# Patient Record
Sex: FEMALE | Race: WHITE | NOT HISPANIC OR LATINO
[De-identification: names, ages, dates, MRNs, and addresses within clinical notes are randomized per-mention and may not be internally consistent; named-entity substitution may affect disease eponyms.]

---

## 2018-06-21 ENCOUNTER — APPOINTMENT (OUTPATIENT)
Dept: OBGYN | Facility: CLINIC | Age: 58
End: 2018-06-21

## 2018-08-13 ENCOUNTER — APPOINTMENT (OUTPATIENT)
Dept: OBGYN | Facility: CLINIC | Age: 58
End: 2018-08-13
Payer: COMMERCIAL

## 2018-08-13 VITALS
WEIGHT: 140 LBS | SYSTOLIC BLOOD PRESSURE: 126 MMHG | BODY MASS INDEX: 27.48 KG/M2 | HEIGHT: 60 IN | HEART RATE: 73 BPM | DIASTOLIC BLOOD PRESSURE: 77 MMHG

## 2018-08-13 DIAGNOSIS — Z01.419 ENCOUNTER FOR GYNECOLOGICAL EXAMINATION (GENERAL) (ROUTINE) W/OUT ABNORMAL FINDINGS: ICD-10-CM

## 2018-08-13 PROCEDURE — 99396 PREV VISIT EST AGE 40-64: CPT

## 2018-08-14 LAB — HPV HIGH+LOW RISK DNA PNL CVX: NOT DETECTED

## 2018-08-16 LAB — CYTOLOGY CVX/VAG DOC THIN PREP: NORMAL

## 2020-01-30 ENCOUNTER — APPOINTMENT (OUTPATIENT)
Dept: OBGYN | Facility: CLINIC | Age: 60
End: 2020-01-30
Payer: COMMERCIAL

## 2020-01-30 VITALS
DIASTOLIC BLOOD PRESSURE: 80 MMHG | WEIGHT: 136 LBS | BODY MASS INDEX: 26.7 KG/M2 | HEIGHT: 60 IN | SYSTOLIC BLOOD PRESSURE: 120 MMHG

## 2020-01-30 PROCEDURE — 82270 OCCULT BLOOD FECES: CPT

## 2020-01-30 PROCEDURE — 99396 PREV VISIT EST AGE 40-64: CPT

## 2020-01-30 NOTE — PHYSICAL EXAM
[Awake] : awake [Alert] : alert [Acute Distress] : no acute distress [Mass] : no breast mass [Soft] : soft [Nipple Discharge] : no nipple discharge [Axillary LAD] : no axillary lymphadenopathy [Tender] : non tender [Oriented x3] : oriented to person, place, and time [No Bleeding] : there was no active vaginal bleeding [Normal] : cervix [Occult Blood] : occult blood test from digital rectal exam was negative [Uterine Adnexae] : were not tender and not enlarged [RRR, No Murmurs] : RRR, no murmurs [CTAB] : CTAB

## 2020-01-31 LAB — HPV HIGH+LOW RISK DNA PNL CVX: NOT DETECTED

## 2020-02-06 LAB — CYTOLOGY CVX/VAG DOC THIN PREP: ABNORMAL

## 2020-11-05 ENCOUNTER — APPOINTMENT (OUTPATIENT)
Dept: OBGYN | Facility: CLINIC | Age: 60
End: 2020-11-05
Payer: COMMERCIAL

## 2020-11-05 VITALS
DIASTOLIC BLOOD PRESSURE: 83 MMHG | HEIGHT: 63 IN | BODY MASS INDEX: 23.04 KG/M2 | SYSTOLIC BLOOD PRESSURE: 147 MMHG | WEIGHT: 130 LBS

## 2020-11-05 PROCEDURE — 99214 OFFICE O/P EST MOD 30 MIN: CPT

## 2020-11-05 PROCEDURE — 99072 ADDL SUPL MATRL&STAF TM PHE: CPT

## 2020-11-05 NOTE — DISCUSSION/SUMMARY
[FreeTextEntry1] : Uterine Prolapse- discussed use of pessary vs. surgery. She would like to talk to MD here about surgical correction, not interested in pessary.

## 2020-11-05 NOTE — HISTORY OF PRESENT ILLNESS
[FreeTextEntry1] : 59 yo here c/o feeling a lump in the vaginal area, especially when she stands up, less while laying down. she noticed this approx. 1 week ago. She has been moving heavy boxes for the past month and wonders if this has caused the problem. she has no vaginal pain, no pelvic pain, no urinary leaking.

## 2020-11-05 NOTE — PHYSICAL EXAM
[Labia Majora] : normal [Labia Minora] : normal [Cystocele] : a cystocele [Rectocele] : a rectocele [Uterine Prolapse] : uterine prolapse [Normal] : normal [Uterine Adnexae] : normal [FreeTextEntry4] : Grade 2/3 uterine prolapse noted

## 2020-11-19 ENCOUNTER — APPOINTMENT (OUTPATIENT)
Dept: OBGYN | Facility: CLINIC | Age: 60
End: 2020-11-19
Payer: COMMERCIAL

## 2020-11-19 VITALS
WEIGHT: 130 LBS | HEART RATE: 81 BPM | SYSTOLIC BLOOD PRESSURE: 126 MMHG | DIASTOLIC BLOOD PRESSURE: 87 MMHG | HEIGHT: 63 IN | BODY MASS INDEX: 23.04 KG/M2

## 2020-11-19 PROCEDURE — 99214 OFFICE O/P EST MOD 30 MIN: CPT

## 2020-12-15 ENCOUNTER — APPOINTMENT (OUTPATIENT)
Dept: OBGYN | Facility: CLINIC | Age: 60
End: 2020-12-15

## 2021-02-22 ENCOUNTER — APPOINTMENT (OUTPATIENT)
Dept: OBGYN | Facility: CLINIC | Age: 61
End: 2021-02-22
Payer: COMMERCIAL

## 2021-02-22 VITALS
SYSTOLIC BLOOD PRESSURE: 120 MMHG | BODY MASS INDEX: 23.39 KG/M2 | DIASTOLIC BLOOD PRESSURE: 80 MMHG | WEIGHT: 132 LBS | HEIGHT: 63 IN

## 2021-02-22 PROCEDURE — 82270 OCCULT BLOOD FECES: CPT

## 2021-02-22 PROCEDURE — 99396 PREV VISIT EST AGE 40-64: CPT

## 2021-02-22 PROCEDURE — 99072 ADDL SUPL MATRL&STAF TM PHE: CPT

## 2021-02-26 LAB
CYTOLOGY CVX/VAG DOC THIN PREP: ABNORMAL
HPV HIGH+LOW RISK DNA PNL CVX: DETECTED

## 2021-03-16 ENCOUNTER — APPOINTMENT (OUTPATIENT)
Dept: OBGYN | Facility: CLINIC | Age: 61
End: 2021-03-16
Payer: COMMERCIAL

## 2021-03-16 ENCOUNTER — ASOB RESULT (OUTPATIENT)
Age: 61
End: 2021-03-16

## 2021-03-16 VITALS
WEIGHT: 132 LBS | SYSTOLIC BLOOD PRESSURE: 120 MMHG | HEIGHT: 63 IN | DIASTOLIC BLOOD PRESSURE: 70 MMHG | BODY MASS INDEX: 23.39 KG/M2

## 2021-03-16 DIAGNOSIS — N81.2 INCOMPLETE UTEROVAGINAL PROLAPSE: ICD-10-CM

## 2021-03-16 PROCEDURE — 99213 OFFICE O/P EST LOW 20 MIN: CPT

## 2021-03-16 PROCEDURE — 76856 US EXAM PELVIC COMPLETE: CPT | Mod: 59

## 2021-03-16 PROCEDURE — 99072 ADDL SUPL MATRL&STAF TM PHE: CPT

## 2021-03-16 PROCEDURE — 76830 TRANSVAGINAL US NON-OB: CPT

## 2021-03-16 NOTE — HISTORY OF PRESENT ILLNESS
[FreeTextEntry1] : pt here to fu us done to evaluate adnexa prior to tvh planned for prolapse. adnexa wnl, uterus small, thin lining, small amt fluid in lining.

## 2021-04-29 ENCOUNTER — OUTPATIENT (OUTPATIENT)
Dept: OUTPATIENT SERVICES | Facility: HOSPITAL | Age: 61
LOS: 1 days | End: 2021-04-29
Payer: COMMERCIAL

## 2021-04-29 VITALS
HEART RATE: 76 BPM | DIASTOLIC BLOOD PRESSURE: 80 MMHG | WEIGHT: 134.48 LBS | OXYGEN SATURATION: 98 % | SYSTOLIC BLOOD PRESSURE: 138 MMHG | TEMPERATURE: 98 F | HEIGHT: 60 IN | RESPIRATION RATE: 20 BRPM

## 2021-04-29 DIAGNOSIS — E78.5 HYPERLIPIDEMIA, UNSPECIFIED: ICD-10-CM

## 2021-04-29 DIAGNOSIS — N81.2 INCOMPLETE UTEROVAGINAL PROLAPSE: ICD-10-CM

## 2021-04-29 DIAGNOSIS — E03.9 HYPOTHYROIDISM, UNSPECIFIED: ICD-10-CM

## 2021-04-29 DIAGNOSIS — Z13.89 ENCOUNTER FOR SCREENING FOR OTHER DISORDER: ICD-10-CM

## 2021-04-29 DIAGNOSIS — N81.4 UTEROVAGINAL PROLAPSE, UNSPECIFIED: ICD-10-CM

## 2021-04-29 DIAGNOSIS — Z01.818 ENCOUNTER FOR OTHER PREPROCEDURAL EXAMINATION: ICD-10-CM

## 2021-04-29 DIAGNOSIS — Z29.9 ENCOUNTER FOR PROPHYLACTIC MEASURES, UNSPECIFIED: ICD-10-CM

## 2021-04-29 DIAGNOSIS — Z90.89 ACQUIRED ABSENCE OF OTHER ORGANS: Chronic | ICD-10-CM

## 2021-04-29 LAB
A1C WITH ESTIMATED AVERAGE GLUCOSE RESULT: 6.6 % — HIGH (ref 4–5.6)
ANION GAP SERPL CALC-SCNC: 11 MMOL/L — SIGNIFICANT CHANGE UP (ref 5–17)
APPEARANCE UR: CLEAR — SIGNIFICANT CHANGE UP
APTT BLD: 32.5 SEC — SIGNIFICANT CHANGE UP (ref 27.5–35.5)
BACTERIA # UR AUTO: NEGATIVE — SIGNIFICANT CHANGE UP
BASOPHILS # BLD AUTO: 0.11 K/UL — SIGNIFICANT CHANGE UP (ref 0–0.2)
BASOPHILS NFR BLD AUTO: 1.4 % — SIGNIFICANT CHANGE UP (ref 0–2)
BILIRUB UR-MCNC: NEGATIVE — SIGNIFICANT CHANGE UP
BLD GP AB SCN SERPL QL: SIGNIFICANT CHANGE UP
BUN SERPL-MCNC: 12 MG/DL — SIGNIFICANT CHANGE UP (ref 8–20)
CALCIUM SERPL-MCNC: 9.8 MG/DL — SIGNIFICANT CHANGE UP (ref 8.6–10.2)
CHLORIDE SERPL-SCNC: 99 MMOL/L — SIGNIFICANT CHANGE UP (ref 98–107)
CO2 SERPL-SCNC: 28 MMOL/L — SIGNIFICANT CHANGE UP (ref 22–29)
COLOR SPEC: YELLOW — SIGNIFICANT CHANGE UP
CREAT SERPL-MCNC: 0.61 MG/DL — SIGNIFICANT CHANGE UP (ref 0.5–1.3)
DIFF PNL FLD: NEGATIVE — SIGNIFICANT CHANGE UP
EOSINOPHIL # BLD AUTO: 0.27 K/UL — SIGNIFICANT CHANGE UP (ref 0–0.5)
EOSINOPHIL NFR BLD AUTO: 3.5 % — SIGNIFICANT CHANGE UP (ref 0–6)
EPI CELLS # UR: SIGNIFICANT CHANGE UP
ESTIMATED AVERAGE GLUCOSE: 143 MG/DL — HIGH (ref 68–114)
GLUCOSE SERPL-MCNC: 121 MG/DL — HIGH (ref 70–99)
GLUCOSE UR QL: NEGATIVE MG/DL — SIGNIFICANT CHANGE UP
HCT VFR BLD CALC: 42.8 % — SIGNIFICANT CHANGE UP (ref 34.5–45)
HGB BLD-MCNC: 14.3 G/DL — SIGNIFICANT CHANGE UP (ref 11.5–15.5)
IMM GRANULOCYTES NFR BLD AUTO: 0.9 % — SIGNIFICANT CHANGE UP (ref 0–1.5)
INR BLD: 1.04 RATIO — SIGNIFICANT CHANGE UP (ref 0.88–1.16)
KETONES UR-MCNC: NEGATIVE — SIGNIFICANT CHANGE UP
LEUKOCYTE ESTERASE UR-ACNC: ABNORMAL
LYMPHOCYTES # BLD AUTO: 2.74 K/UL — SIGNIFICANT CHANGE UP (ref 1–3.3)
LYMPHOCYTES # BLD AUTO: 35.4 % — SIGNIFICANT CHANGE UP (ref 13–44)
MCHC RBC-ENTMCNC: 29.5 PG — SIGNIFICANT CHANGE UP (ref 27–34)
MCHC RBC-ENTMCNC: 33.4 GM/DL — SIGNIFICANT CHANGE UP (ref 32–36)
MCV RBC AUTO: 88.2 FL — SIGNIFICANT CHANGE UP (ref 80–100)
MONOCYTES # BLD AUTO: 0.58 K/UL — SIGNIFICANT CHANGE UP (ref 0–0.9)
MONOCYTES NFR BLD AUTO: 7.5 % — SIGNIFICANT CHANGE UP (ref 2–14)
NEUTROPHILS # BLD AUTO: 3.98 K/UL — SIGNIFICANT CHANGE UP (ref 1.8–7.4)
NEUTROPHILS NFR BLD AUTO: 51.3 % — SIGNIFICANT CHANGE UP (ref 43–77)
NITRITE UR-MCNC: NEGATIVE — SIGNIFICANT CHANGE UP
PH UR: 7 — SIGNIFICANT CHANGE UP (ref 5–8)
PLATELET # BLD AUTO: 464 K/UL — HIGH (ref 150–400)
POTASSIUM SERPL-MCNC: 4.2 MMOL/L — SIGNIFICANT CHANGE UP (ref 3.5–5.3)
POTASSIUM SERPL-SCNC: 4.2 MMOL/L — SIGNIFICANT CHANGE UP (ref 3.5–5.3)
PROT UR-MCNC: NEGATIVE MG/DL — SIGNIFICANT CHANGE UP
PROTHROM AB SERPL-ACNC: 12 SEC — SIGNIFICANT CHANGE UP (ref 10.6–13.6)
RBC # BLD: 4.85 M/UL — SIGNIFICANT CHANGE UP (ref 3.8–5.2)
RBC # FLD: 13.6 % — SIGNIFICANT CHANGE UP (ref 10.3–14.5)
RBC CASTS # UR COMP ASSIST: NEGATIVE /HPF — SIGNIFICANT CHANGE UP (ref 0–4)
SODIUM SERPL-SCNC: 138 MMOL/L — SIGNIFICANT CHANGE UP (ref 135–145)
SP GR SPEC: 1 — LOW (ref 1.01–1.02)
UROBILINOGEN FLD QL: NEGATIVE MG/DL — SIGNIFICANT CHANGE UP
WBC # BLD: 7.75 K/UL — SIGNIFICANT CHANGE UP (ref 3.8–10.5)
WBC # FLD AUTO: 7.75 K/UL — SIGNIFICANT CHANGE UP (ref 3.8–10.5)
WBC UR QL: SIGNIFICANT CHANGE UP

## 2021-04-29 PROCEDURE — 93010 ELECTROCARDIOGRAM REPORT: CPT

## 2021-04-29 PROCEDURE — G0463: CPT

## 2021-04-29 PROCEDURE — 93005 ELECTROCARDIOGRAM TRACING: CPT

## 2021-04-29 NOTE — H&P PST ADULT - NSANTHOSAYNRD_GEN_A_CORE
No. LEW screening performed.  STOP BANG Legend: 0-2 = LOW Risk; 3-4 = INTERMEDIATE Risk; 5-8 = HIGH Risk

## 2021-04-29 NOTE — H&P PST ADULT - LAB RESULTS AND INTERPRETATION
results pending Labs reviewed results faxed to PCP and surgeon for review. Patient is pending medical clearance. Repeat CBC ordered for morning of surgery.

## 2021-04-29 NOTE — H&P PST ADULT - ASSESSMENT
This is a pleasant 61 year old female in NAD with history of HLD, hypothyroidism and chronically dry eyes. Diagnosed with incomplete uterovaginal prolapse. Now scheduled for total vaginal abdominal hysterectomy, anterior/posterior repair with Dr. Oropeza on 21 pending medical clearance.   Patient to have medical clearance with Dr. Benitez  Patient educated on surgical scrub, COVID testing, preadmission instructions, medical clearance and day of procedure medications, verbalizes understanding.  Patient instructed to stop ASA/Herbals or anti-inflammatory meds one week prior to surgery and discuss with PCP.      CAPRINI SCORE    AGE RELATED RISK FACTORS                                                             [ ] Age 41-60 years                                            (1 Point)  [X ] Age: 61-74 years                                           (2 Points)                 [ ] Age= 75 years                                                (3 Points)             DISEASE RELATED RISK FACTORS                                                       [ ] Edema in the lower extremities                 (1 Point)                     [ ] Varicose veins                                               (1 Point)                                 [X ] BMI > 25 Kg/m2                                            (1 Point)                                  [ ] Serious infection (ie PNA)                            (1 Point)                     [ ] Lung disease ( COPD, Emphysema)            (1 Point)                                                                          [ ] Acute myocardial infarction                         (1 Point)                  [ ] Congestive heart failure (in the previous month)  (1 Point)         [ ] Inflammatory bowel disease                            (1 Point)                  [ ] Central venous access, PICC or Port               (2 points)       (within the last month)                                                                [ ] Stroke (in the previous month)                        (5 Points)    [ ] Previous or present malignancy                       (2 points)                                                                                                                                                         HEMATOLOGY RELATED FACTORS                                                         [ ] Prior episodes of VTE                                     (3 Points)                     [ ] Positive family history for VTE                      (3 Points)                  [ ] Prothrombin 44402 A                                     (3 Points)                     [ ] Factor V Leiden                                                (3 Points)                        [ ] Lupus anticoagulants                                      (3 Points)                                                           [ ] Anticardiolipin antibodies                              (3 Points)                                                       [ ] High homocysteine in the blood                   (3 Points)                                             [ ] Other congenital or acquired thrombophilia      (3 Points)                                                [ ] Heparin induced thrombocytopenia                  (3 Points)                                        MOBILITY RELATED FACTORS  [ ] Bed rest                                                         (1 Point)  [ ] Plaster cast                                                    (2 points)  [ ] Bed bound for more than 72 hours           (2 Points)    GENDER SPECIFIC FACTORS  [ ] Pregnancy or had a baby within the last month   (1 Point)  [ ] Post-partum < 6 weeks                                   (1 Point)  [ ] Hormonal therapy  or oral contraception   (1 Point)  [ ] History of pregnancy complications              (1 point)  [ ] Unexplained or recurrent              (1 Point)    OTHER RISK FACTORS                                           (1 Point)  [X ] BMI >40, smoking, diabetes requiring insulin, chemotherapy  blood transfusions and length of surgery over 2 hours    SURGERY RELATED RISK FACTORS  [ ]  Section within the last month     (1 Point)  [ ] Minor surgery                                                  (1 Point)  [ ] Arthroscopic surgery                                       (2 Points)  [ X] Planned major surgery lasting more            (2 Points)      than 45 minutes     [ ] Elective hip or knee joint replacement       (5 points)       surgery                                                TRAUMA RELATED RISK FACTORS  [ ] Fracture of the hip, pelvis, or leg                       (5 Points)  [ ] Spinal cord injury resulting in paralysis             (5 points)       (in the previous month)    [ ] Paralysis  (less than 1 month)                             (5 Points)  [ ] Multiple Trauma within 1 month                        (5 Points)    Total Score [     6   ]    Caprini Score 0-2: Low Risk, NO VTE prophylaxis required for most patients, encourage ambulation  Caprini Score 3-6: Moderate Risk , pharmacologic VTE prophylaxis is indicated for most patients (in the absence of contraindications)  Caprini Score Greater than or =7: High risk, pharmocologic VTE prophylaxis indicated for most patients (in the absence of contraindications)    OPIOID RISK TOOL    KAMI EACH BOX THAT APPLIES AND ADD TOTALS AT THE END    FAMILY HISTORY OF SUBSTANCE ABUSE                 FEMALE         MALE                                                Alcohol                             [  ]1 pt          [  ]3pts                                               Illegal Durgs                     [  ]2 pts        [  ]3pts                                               Rx Drugs                           [  ]4 pts        [  ]4 pts    PERSONAL HISTORY OF SUBSTANCE ABUSE                                                                                          Alcohol                             [  ]3 pts       [  ]3 pts                                               Illegal Drugs                     [  ]4 pts        [  ]4 pts                                               Rx Drugs                           [  ]5 pts        [  ]5 pts    AGE BETWEEN 16-45 YEARS                                      [  ]1 pt         [  ]1 pt    HISTORY OF PREADOLESCENT   SEXUAL ABUSE                                                             [  ]3 pts        [  ]0pts    PSYCHOLOGICAL DISEASE                     ADD, OCD, Bipolar, Schizophrenia        [  ]2 pts         [  ]2 pts                      Depression                                               [  ]1 pt           [  ]1 pt           SCORING TOTAL   (add numbers and type here)              (**0*)                                     A score of 3 or lower indicated LOW risk for future opioid abuse  A score of 4 to 7 indicated moderate risk for future opioid abuse  A score of 8 or higher indicates a high risk for opioid abuse

## 2021-04-29 NOTE — H&P PST ADULT - RS GEN PE MLT RESP DETAILS PC
breath sounds equal/good air movement/respirations non-labored/clear to auscultation bilaterally/no rales/no rhonchi

## 2021-04-29 NOTE — H&P PST ADULT - NS MD HP PULSE CAROTID
What Type Of Note Output Would You Prefer (Optional)?: Bullet Format
How Severe Is Your Rash?: mild
Is This A New Presentation, Or A Follow-Up?: Rash
Additional History: Pt has tried neosporin  on the rash
right normal/left normal

## 2021-04-29 NOTE — H&P PST ADULT - NSICDXPROBLEM_GEN_ALL_CORE_FT
PROBLEM DIAGNOSES  Problem: Hyperlipidemia  Assessment and Plan: .Continue medications as instructed. Medical clearance pending     Problem: Hypothyroidism  Assessment and Plan: .Continue medications as instructed. Patient instructed to take levothyroxine day of procedure with a sip of water, understanding verbalized. Medical clearance pending.     Problem: Need for prophylactic measure  Assessment and Plan: CAP score 6 patient Moderate Risk,  SCDs ordered for day of procedure.  Surgical team to assess need for VTE prophylaxis    Problem: Screening for substance abuse  Assessment and Plan: ORT score 0 patient low risk     Problem: Incomplete uterovaginal prolapse  Assessment and Plan: Scheduled for total vaginal abdominal hysterectomy, anterior/posterior repair with Dr. Oropeza pending medical clearance.      Problem: Uterovaginal prolapse  Assessment and Plan: Scheduled for total vaginal abdominal hysterectomy, anterior/posterior repai with / Johnna pending medical clearance.

## 2021-04-29 NOTE — PATIENT PROFILE ADULT - NSPREOP1_ABLETOREACHPT_GEN_A_NUR
Toxicology [phone]    Denies domestic or international travel in the past 3 weeks [phone]    Denies domestic or international travel in the past 3 weeks/yes

## 2021-04-29 NOTE — H&P PST ADULT - NSICDXFAMILYHX_GEN_ALL_CORE_FT
FAMILY HISTORY:  Father  Still living? No  FH: diabetes mellitus, Age at diagnosis: Age Unknown    Grandparent  Still living? No  FH: diabetes mellitus, Age at diagnosis: Age Unknown

## 2021-04-29 NOTE — H&P PST ADULT - HISTORY OF PRESENT ILLNESS
61 year old female with history of   present today for PST c/o   Patient felt pelvic pressure after remolding her kitchen and lifting heavy items in January.  States went for her annual GYN and informed she had a "prolapse". Pessary was inserted, patient states it was uncomfortable and had removed. Other options were discussed.    61 year old female with history of HLD, hypothyroidism and chronically dry eyes present today for PST c/o a "lump in my vaginal area". Patient states while  remolding her kitchen and lifting heavy items November 2020 she felt pelvic pressure and lump in her vaginal area..  States when she went for her annual GYN, she was informed she had a "prolapse". Patient states a Pessary was inserted, but reports it was very uncomfortable and had it removed. States other options were discussed and she decided on the hysterectomy. Reports recent UTI and was placed on Cipro which was completed 4/28/21. Denies pelvic pain, pelvic pressure, incontinence or change in bowel or bladder function. Denies nausea, vomiting, fever, chills, chest pain or palpitations. Now scheduled for total vaginal abdominal hysterectomy, anterior/posterior repair with Dr. Oropeza on 5/20/21 pending medical clearance.

## 2021-04-29 NOTE — PATIENT PROFILE ADULT - NSPROHMSYMPCOND_GEN_A_NUR
Pre op teaching surgical scrub pain management instructions and covid swab instructions given to pt/none

## 2021-04-30 LAB
CULTURE RESULTS: SIGNIFICANT CHANGE UP
SPECIMEN SOURCE: SIGNIFICANT CHANGE UP

## 2021-05-14 DIAGNOSIS — Z01.818 ENCOUNTER FOR OTHER PREPROCEDURAL EXAMINATION: ICD-10-CM

## 2021-05-17 ENCOUNTER — APPOINTMENT (OUTPATIENT)
Dept: DISASTER EMERGENCY | Facility: CLINIC | Age: 61
End: 2021-05-17

## 2021-05-18 LAB — SARS-COV-2 N GENE NPH QL NAA+PROBE: NOT DETECTED

## 2021-05-18 NOTE — PHYSICAL EXAM
[Labia Majora] : normal [Labia Minora] : normal [Cystocele] : a cystocele [Rectocele] : a rectocele [Uterine Prolapse] : uterine prolapse [Normal] : normal [Uterine Adnexae] : normal [FreeTextEntry4] : cervix prolapses through vaginal orefice w valsalva.

## 2021-05-18 NOTE — PHYSICAL EXAM
[Awake] : awake [Alert] : alert [Soft] : soft [Oriented x3] : oriented to person, place, and time [Normal] : uterus [No Bleeding] : there was no active vaginal bleeding [Uterine Adnexae] : were not tender and not enlarged [RRR, No Murmurs] : RRR, no murmurs [CTAB] : CTAB [Cystocele] : a cystocele [Uterine Prolapse] : uterine prolapse [Acute Distress] : no acute distress [Mass] : no breast mass [Nipple Discharge] : no nipple discharge [Axillary LAD] : no axillary lymphadenopathy [Tender] : non tender [Occult Blood] : occult blood test from digital rectal exam was negative [FreeTextEntry4] : cervix prolapsing through vaginal orefice with valsalva

## 2021-05-18 NOTE — DISCUSSION/SUMMARY
[FreeTextEntry1] : prolapse. dw pt rba pessary and surgical tx. wants temporizing pessary, surg next yr. fitted for size 4 ring w support.

## 2021-05-24 PROBLEM — E03.9 HYPOTHYROIDISM, UNSPECIFIED: Chronic | Status: ACTIVE | Noted: 2021-04-29

## 2021-05-24 PROBLEM — E78.5 HYPERLIPIDEMIA, UNSPECIFIED: Chronic | Status: ACTIVE | Noted: 2021-04-29

## 2021-05-24 PROBLEM — H04.123 DRY EYE SYNDROME OF BILATERAL LACRIMAL GLANDS: Chronic | Status: ACTIVE | Noted: 2021-04-29

## 2021-05-25 ENCOUNTER — APPOINTMENT (OUTPATIENT)
Dept: OBGYN | Facility: CLINIC | Age: 61
End: 2021-05-25
Payer: COMMERCIAL

## 2021-05-25 VITALS
SYSTOLIC BLOOD PRESSURE: 120 MMHG | HEIGHT: 63 IN | BODY MASS INDEX: 23.39 KG/M2 | DIASTOLIC BLOOD PRESSURE: 70 MMHG | WEIGHT: 132 LBS

## 2021-05-25 DIAGNOSIS — N81.4 UTEROVAGINAL PROLAPSE, UNSPECIFIED: ICD-10-CM

## 2021-05-25 DIAGNOSIS — N81.6 CYSTOCELE, UNSPECIFIED: ICD-10-CM

## 2021-05-25 DIAGNOSIS — N81.10 CYSTOCELE, UNSPECIFIED: ICD-10-CM

## 2021-05-25 PROCEDURE — 99072 ADDL SUPL MATRL&STAF TM PHE: CPT

## 2021-05-25 PROCEDURE — 99213 OFFICE O/P EST LOW 20 MIN: CPT

## 2021-05-25 NOTE — HISTORY OF PRESENT ILLNESS
[FreeTextEntry1] : 61 year old patient here to fu after prolapse  surgery was denied coverage by insurance company. PT reports ongoing complaints associated with her genital prolapse, including  problems with defecation, finds she must push up rectocoele to move bowels, incomplete voiding, , freq utis. She reports that she used to walk four miles daily but cannot any more due to discomfort and pressure associated with her prolapsing organs.  She notes as an enlarging ball at the vagina while standing, and after activities.being on her feet, feels a large ball coming out of her vagina. She reports it is reducible. She tried using a pessary but found it painful. She denies incontinence. \par She is requesting definitive surgery. Her symptoms started in NOvember 2019, and have gotten progressively worse, now impacting her day to day activities. \par MEd hx sig for dm, hypothyroidism, colitis.

## 2021-05-25 NOTE — PHYSICAL EXAM
[Labia Majora] : normal [Cystocele] : a cystocele [Rectocele] : a rectocele [Uterine Prolapse] : uterine prolapse [No Bleeding] : There was no active vaginal bleeding [Normal] : normal [FreeTextEntry5] : prolapsed w valsalva. [FreeTextEntry4] : with cough, bladder and cervix protrude several centimeters outside the vaginal introitus.

## 2021-05-25 NOTE — DISCUSSION/SUMMARY
[FreeTextEntry1] : pt with prolapse of cystocoele and cervix beyond vaginal introitus with valsalva on exam. \par Pt is symptomatic- with the prolapse interfering with ambulation, urination and bowel movements, and daily activities. She reports frequent utis since the prolapse has gotten worse. . She reports that she found vaginal pessary uncomfortable, and could not tolerate using it. \par We discussed the risks and benefits of vaginal hysterectomy with vaginal repair, including bleeding, infection, damage to organs, need for additional surgeries. she would like to proceed with vaginal hysterectomy and ap repair.

## 2021-06-08 ENCOUNTER — OUTPATIENT (OUTPATIENT)
Dept: OUTPATIENT SERVICES | Facility: HOSPITAL | Age: 61
LOS: 1 days | End: 2021-06-08
Payer: COMMERCIAL

## 2021-06-08 VITALS
RESPIRATION RATE: 20 BRPM | WEIGHT: 136.69 LBS | DIASTOLIC BLOOD PRESSURE: 70 MMHG | SYSTOLIC BLOOD PRESSURE: 130 MMHG | TEMPERATURE: 97 F | HEIGHT: 60 IN | HEART RATE: 74 BPM

## 2021-06-08 DIAGNOSIS — Z29.9 ENCOUNTER FOR PROPHYLACTIC MEASURES, UNSPECIFIED: ICD-10-CM

## 2021-06-08 DIAGNOSIS — Z01.818 ENCOUNTER FOR OTHER PREPROCEDURAL EXAMINATION: ICD-10-CM

## 2021-06-08 DIAGNOSIS — Z90.89 ACQUIRED ABSENCE OF OTHER ORGANS: Chronic | ICD-10-CM

## 2021-06-08 DIAGNOSIS — N81.2 INCOMPLETE UTEROVAGINAL PROLAPSE: ICD-10-CM

## 2021-06-08 DIAGNOSIS — E03.9 HYPOTHYROIDISM, UNSPECIFIED: ICD-10-CM

## 2021-06-08 LAB
A1C WITH ESTIMATED AVERAGE GLUCOSE RESULT: 6.1 % — HIGH (ref 4–5.6)
ANION GAP SERPL CALC-SCNC: 13 MMOL/L — SIGNIFICANT CHANGE UP (ref 5–17)
APPEARANCE UR: CLEAR — SIGNIFICANT CHANGE UP
APTT BLD: 33.2 SEC — SIGNIFICANT CHANGE UP (ref 27.5–35.5)
BASOPHILS # BLD AUTO: 0.05 K/UL — SIGNIFICANT CHANGE UP (ref 0–0.2)
BASOPHILS NFR BLD AUTO: 0.7 % — SIGNIFICANT CHANGE UP (ref 0–2)
BILIRUB UR-MCNC: NEGATIVE — SIGNIFICANT CHANGE UP
BLD GP AB SCN SERPL QL: SIGNIFICANT CHANGE UP
BUN SERPL-MCNC: 15.5 MG/DL — SIGNIFICANT CHANGE UP (ref 8–20)
CALCIUM SERPL-MCNC: 10 MG/DL — SIGNIFICANT CHANGE UP (ref 8.6–10.2)
CHLORIDE SERPL-SCNC: 98 MMOL/L — SIGNIFICANT CHANGE UP (ref 98–107)
CO2 SERPL-SCNC: 28 MMOL/L — SIGNIFICANT CHANGE UP (ref 22–29)
COLOR SPEC: YELLOW — SIGNIFICANT CHANGE UP
CREAT SERPL-MCNC: 0.53 MG/DL — SIGNIFICANT CHANGE UP (ref 0.5–1.3)
DIFF PNL FLD: NEGATIVE — SIGNIFICANT CHANGE UP
EOSINOPHIL # BLD AUTO: 0.1 K/UL — SIGNIFICANT CHANGE UP (ref 0–0.5)
EOSINOPHIL NFR BLD AUTO: 1.4 % — SIGNIFICANT CHANGE UP (ref 0–6)
ESTIMATED AVERAGE GLUCOSE: 128 MG/DL — HIGH (ref 68–114)
GLUCOSE SERPL-MCNC: 98 MG/DL — SIGNIFICANT CHANGE UP (ref 70–99)
GLUCOSE UR QL: NEGATIVE MG/DL — SIGNIFICANT CHANGE UP
HCT VFR BLD CALC: 45.1 % — HIGH (ref 34.5–45)
HGB BLD-MCNC: 15 G/DL — SIGNIFICANT CHANGE UP (ref 11.5–15.5)
IMM GRANULOCYTES NFR BLD AUTO: 0.1 % — SIGNIFICANT CHANGE UP (ref 0–1.5)
INR BLD: 1.03 RATIO — SIGNIFICANT CHANGE UP (ref 0.88–1.16)
KETONES UR-MCNC: NEGATIVE — SIGNIFICANT CHANGE UP
LEUKOCYTE ESTERASE UR-ACNC: NEGATIVE — SIGNIFICANT CHANGE UP
LYMPHOCYTES # BLD AUTO: 3 K/UL — SIGNIFICANT CHANGE UP (ref 1–3.3)
LYMPHOCYTES # BLD AUTO: 41.8 % — SIGNIFICANT CHANGE UP (ref 13–44)
MCHC RBC-ENTMCNC: 29.6 PG — SIGNIFICANT CHANGE UP (ref 27–34)
MCHC RBC-ENTMCNC: 33.3 GM/DL — SIGNIFICANT CHANGE UP (ref 32–36)
MCV RBC AUTO: 89 FL — SIGNIFICANT CHANGE UP (ref 80–100)
MONOCYTES # BLD AUTO: 0.45 K/UL — SIGNIFICANT CHANGE UP (ref 0–0.9)
MONOCYTES NFR BLD AUTO: 6.3 % — SIGNIFICANT CHANGE UP (ref 2–14)
NEUTROPHILS # BLD AUTO: 3.56 K/UL — SIGNIFICANT CHANGE UP (ref 1.8–7.4)
NEUTROPHILS NFR BLD AUTO: 49.7 % — SIGNIFICANT CHANGE UP (ref 43–77)
NITRITE UR-MCNC: NEGATIVE — SIGNIFICANT CHANGE UP
PH UR: 7 — SIGNIFICANT CHANGE UP (ref 5–8)
PLATELET # BLD AUTO: 350 K/UL — SIGNIFICANT CHANGE UP (ref 150–400)
POTASSIUM SERPL-MCNC: 4.2 MMOL/L — SIGNIFICANT CHANGE UP (ref 3.5–5.3)
POTASSIUM SERPL-SCNC: 4.2 MMOL/L — SIGNIFICANT CHANGE UP (ref 3.5–5.3)
PROT UR-MCNC: NEGATIVE MG/DL — SIGNIFICANT CHANGE UP
PROTHROM AB SERPL-ACNC: 11.9 SEC — SIGNIFICANT CHANGE UP (ref 10.6–13.6)
RBC # BLD: 5.07 M/UL — SIGNIFICANT CHANGE UP (ref 3.8–5.2)
RBC # FLD: 13.7 % — SIGNIFICANT CHANGE UP (ref 10.3–14.5)
SODIUM SERPL-SCNC: 139 MMOL/L — SIGNIFICANT CHANGE UP (ref 135–145)
SP GR SPEC: 1 — LOW (ref 1.01–1.02)
UROBILINOGEN FLD QL: NEGATIVE MG/DL — SIGNIFICANT CHANGE UP
WBC # BLD: 7.17 K/UL — SIGNIFICANT CHANGE UP (ref 3.8–10.5)
WBC # FLD AUTO: 7.17 K/UL — SIGNIFICANT CHANGE UP (ref 3.8–10.5)

## 2021-06-08 PROCEDURE — G0463: CPT

## 2021-06-08 NOTE — H&P PST ADULT - ATTENDING COMMENTS
62 yo w prolapse and cystocoele for vaginal hysterectomy with vaginal repair, agree w above. rba reviewed w pt incl risk bleeding infection damage to organs need for addl surgery.

## 2021-06-08 NOTE — H&P PST ADULT - HISTORY OF PRESENT ILLNESS
61 year old female with history of HLD, hypothyroidism and chronically dry eyes present today for PST c/o a "lump in my vaginal area". Patient states while  remolding her kitchen and lifting heavy items November 2020 she felt pelvic pressure and lump in her vaginal area..  States when she went for her annual GYN, she was informed she had a "prolapse". Patient states a Pessary was inserted, but reports it was very uncomfortable and had it removed. States other options were discussed and she decided on the hysterectomy. Reports recent UTI and was placed on Cipro which was completed 4/28/21. Denies pelvic pain, pelvic pressure, incontinence or change in bowel or bladder function. Denies nausea, vomiting, fever, chills, chest pain or palpitations. Now scheduled for total vaginal abdominal hysterectomy, anterior/posterior repair with Dr. Oropeza on 6/14/21 pending medical clearance.    61 year old female with history of HLD, hypothyroidism and chronically dry eyes present today for PST c/o a "lump in my vaginal area". Patient states while  remolding her kitchen and lifting heavy items November 2020 she felt pelvic pressure and lump in her vaginal area. States when she went for her annual GYN, she was informed she had a "prolapse". Patient states a Pessary was inserted, but reports it was very uncomfortable and had it removed. States other options were discussed and she decided on the hysterectomy Denies pelvic pain, pelvic pressure, incontinence or change in bowel or bladder function. Denies nausea, vomiting, fever, chills, chest pain or palpitations. Now scheduled for total vaginal abdominal hysterectomy, anterior/posterior repair with Dr. Oropeza on 6/14/21 pending medical clearance.

## 2021-06-08 NOTE — PATIENT PROFILE ADULT - NSPREOP1_ABLETOREACHPT_GEN_A_NUR
[phone]    Denies domestic or international travel in the past 3 weeks [phone]    Denies domestic or international travel in the past 3 weeks/yes

## 2021-06-08 NOTE — H&P PST ADULT - NSICDXPROBLEM_GEN_ALL_CORE_FT
PROBLEM DIAGNOSES  Problem: Incomplete uterine prolapse  Assessment and Plan:  vaginal abdominal hysterectomy, anterior/posterior repair with Dr. Oropeza on 6/14/21 Patient to have medical clearance with Dr. Benitez [phone]      Problem: Hypothyroidism  Assessment and Plan: Asked the patient to take thyroid medication on DOP.      Problem: Need for prophylactic measure  Assessment and Plan: Moderate Risk, surgical team should consider VTE prophylaxis

## 2021-06-08 NOTE — H&P PST ADULT - NSICDXPASTMEDICALHX_GEN_ALL_CORE_FT
PAST MEDICAL HISTORY:  Chronically dry eyes, bilateral     Hyperlipidemia     Hypothyroidism     Pre-diabetes diet controlled

## 2021-06-08 NOTE — H&P PST ADULT - ASSESSMENT
CAPRINI SCORE    AGE RELATED RISK FACTORS                                                             [ ] Age 41-60 years                                            (1 Point)  [X ] Age: 61-74 years                                           (2 Points)                 [ ] Age= 75 years                                                (3 Points)             DISEASE RELATED RISK FACTORS                                                       [ ] Edema in the lower extremities                 (1 Point)                     [ ] Varicose veins                                               (1 Point)                                 [X ] BMI > 25 Kg/m2                                            (1 Point)                                  [ ] Serious infection (ie PNA)                            (1 Point)                     [ ] Lung disease ( COPD, Emphysema)            (1 Point)                                                                          [ ] Acute myocardial infarction                         (1 Point)                  [ ] Congestive heart failure (in the previous month)  (1 Point)         [ ] Inflammatory bowel disease                            (1 Point)                  [ ] Central venous access, PICC or Port               (2 points)       (within the last month)                                                                [ ] Stroke (in the previous month)                        (5 Points)    [ ] Previous or present malignancy                       (2 points)                                                                                                                                                         HEMATOLOGY RELATED FACTORS                                                         [ ] Prior episodes of VTE                                     (3 Points)                     [ ] Positive family history for VTE                      (3 Points)                  [ ] Prothrombin 63020 A                                     (3 Points)                     [ ] Factor V Leiden                                                (3 Points)                        [ ] Lupus anticoagulants                                      (3 Points)                                                           [ ] Anticardiolipin antibodies                              (3 Points)                                                       [ ] High homocysteine in the blood                   (3 Points)                                             [ ] Other congenital or acquired thrombophilia      (3 Points)                                                [ ] Heparin induced thrombocytopenia                  (3 Points)                                        MOBILITY RELATED FACTORS  [ ] Bed rest                                                         (1 Point)  [ ] Plaster cast                                                    (2 points)  [ ] Bed bound for more than 72 hours           (2 Points)    GENDER SPECIFIC FACTORS  [ ] Pregnancy or had a baby within the last month   (1 Point)  [ ] Post-partum < 6 weeks                                   (1 Point)  [ ] Hormonal therapy  or oral contraception   (1 Point)  [ ] History of pregnancy complications              (1 point)  [ ] Unexplained or recurrent              (1 Point)    OTHER RISK FACTORS                                           (1 Point)  [X ] BMI >40, smoking, diabetes requiring insulin, chemotherapy  blood transfusions and length of surgery over 2 hours    SURGERY RELATED RISK FACTORS  [ ]  Section within the last month     (1 Point)  [ ] Minor surgery                                                  (1 Point)  [ ] Arthroscopic surgery                                       (2 Points)  [ X] Planned major surgery lasting more            (2 Points)      than 45 minutes     [ ] Elective hip or knee joint replacement       (5 points)       surgery                                                TRAUMA RELATED RISK FACTORS  [ ] Fracture of the hip, pelvis, or leg                       (5 Points)  [ ] Spinal cord injury resulting in paralysis             (5 points)       (in the previous month)    [ ] Paralysis  (less than 1 month)                             (5 Points)  [ ] Multiple Trauma within 1 month                        (5 Points)    Total Score [     6   ]    Caprini Score 0-2: Low Risk, NO VTE prophylaxis required for most patients, encourage ambulation  Caprini Score 3-6: Moderate Risk , pharmacologic VTE prophylaxis is indicated for most patients (in the absence of contraindications)  Caprini Score Greater than or =7: High risk, pharmocologic VTE prophylaxis indicated for most patients (in the absence of contraindications)    This is a pleasant 61 year old female pmhx of HLD, hypothyroidism and chronically dry eyes. Diagnosed with incomplete uterovaginal prolapse. Now scheduled for  vaginal abdominal hysterectomy, anterior/posterior repair with Dr. Oropeza on 21 Patient to have medical clearance with Dr. Benitez  Patient educated on surgical scrub, COVID testing, preadmission instructions, medical clearance and day of procedure medications, verbalizes understanding.  Patient instructed to stop ASA/Herbals or anti-inflammatory meds one week prior to surgery and discuss with PCP.

## 2021-06-10 LAB
CULTURE RESULTS: SIGNIFICANT CHANGE UP
SPECIMEN SOURCE: SIGNIFICANT CHANGE UP

## 2021-06-13 ENCOUNTER — TRANSCRIPTION ENCOUNTER (OUTPATIENT)
Age: 61
End: 2021-06-13

## 2021-06-14 ENCOUNTER — INPATIENT (INPATIENT)
Facility: HOSPITAL | Age: 61
LOS: 0 days | Discharge: HOME | DRG: 743 | End: 2021-06-15
Payer: COMMERCIAL

## 2021-06-14 ENCOUNTER — RESULT REVIEW (OUTPATIENT)
Age: 61
End: 2021-06-14

## 2021-06-14 VITALS
TEMPERATURE: 98 F | WEIGHT: 138.89 LBS | SYSTOLIC BLOOD PRESSURE: 126 MMHG | HEIGHT: 59.84 IN | RESPIRATION RATE: 16 BRPM | OXYGEN SATURATION: 100 % | DIASTOLIC BLOOD PRESSURE: 76 MMHG | HEART RATE: 76 BPM

## 2021-06-14 DIAGNOSIS — Z90.89 ACQUIRED ABSENCE OF OTHER ORGANS: Chronic | ICD-10-CM

## 2021-06-14 DIAGNOSIS — N81.2 INCOMPLETE UTEROVAGINAL PROLAPSE: ICD-10-CM

## 2021-06-14 LAB
ANION GAP SERPL CALC-SCNC: 12 MMOL/L — SIGNIFICANT CHANGE UP (ref 5–17)
APTT BLD: 32.1 SEC — SIGNIFICANT CHANGE UP (ref 27.5–35.5)
BUN SERPL-MCNC: 14.6 MG/DL — SIGNIFICANT CHANGE UP (ref 8–20)
CALCIUM SERPL-MCNC: 8.3 MG/DL — LOW (ref 8.6–10.2)
CHLORIDE SERPL-SCNC: 105 MMOL/L — SIGNIFICANT CHANGE UP (ref 98–107)
CO2 SERPL-SCNC: 21 MMOL/L — LOW (ref 22–29)
CREAT SERPL-MCNC: 0.47 MG/DL — LOW (ref 0.5–1.3)
GLUCOSE BLDC GLUCOMTR-MCNC: 115 MG/DL — HIGH (ref 70–99)
GLUCOSE BLDC GLUCOMTR-MCNC: 132 MG/DL — HIGH (ref 70–99)
GLUCOSE BLDC GLUCOMTR-MCNC: 144 MG/DL — HIGH (ref 70–99)
GLUCOSE SERPL-MCNC: 182 MG/DL — HIGH (ref 70–99)
HCT VFR BLD CALC: 34.9 % — SIGNIFICANT CHANGE UP (ref 34.5–45)
HCT VFR BLD CALC: 36.8 % — SIGNIFICANT CHANGE UP (ref 34.5–45)
HCT VFR BLD CALC: 38.7 % — SIGNIFICANT CHANGE UP (ref 34.5–45)
HGB BLD-MCNC: 11.6 G/DL — SIGNIFICANT CHANGE UP (ref 11.5–15.5)
HGB BLD-MCNC: 11.9 G/DL — SIGNIFICANT CHANGE UP (ref 11.5–15.5)
HGB BLD-MCNC: 12.8 G/DL — SIGNIFICANT CHANGE UP (ref 11.5–15.5)
INR BLD: 1.09 RATIO — SIGNIFICANT CHANGE UP (ref 0.88–1.16)
MCHC RBC-ENTMCNC: 29.5 PG — SIGNIFICANT CHANGE UP (ref 27–34)
MCHC RBC-ENTMCNC: 30 PG — SIGNIFICANT CHANGE UP (ref 27–34)
MCHC RBC-ENTMCNC: 30.1 PG — SIGNIFICANT CHANGE UP (ref 27–34)
MCHC RBC-ENTMCNC: 32.3 GM/DL — SIGNIFICANT CHANGE UP (ref 32–36)
MCHC RBC-ENTMCNC: 33.1 GM/DL — SIGNIFICANT CHANGE UP (ref 32–36)
MCHC RBC-ENTMCNC: 33.2 GM/DL — SIGNIFICANT CHANGE UP (ref 32–36)
MCV RBC AUTO: 90.2 FL — SIGNIFICANT CHANGE UP (ref 80–100)
MCV RBC AUTO: 91.1 FL — SIGNIFICANT CHANGE UP (ref 80–100)
MCV RBC AUTO: 91.3 FL — SIGNIFICANT CHANGE UP (ref 80–100)
PLATELET # BLD AUTO: 211 K/UL — SIGNIFICANT CHANGE UP (ref 150–400)
PLATELET # BLD AUTO: 231 K/UL — SIGNIFICANT CHANGE UP (ref 150–400)
PLATELET # BLD AUTO: 275 K/UL — SIGNIFICANT CHANGE UP (ref 150–400)
POTASSIUM SERPL-MCNC: 3.9 MMOL/L — SIGNIFICANT CHANGE UP (ref 3.5–5.3)
POTASSIUM SERPL-SCNC: 3.9 MMOL/L — SIGNIFICANT CHANGE UP (ref 3.5–5.3)
PROTHROM AB SERPL-ACNC: 12.6 SEC — SIGNIFICANT CHANGE UP (ref 10.6–13.6)
RBC # BLD: 3.87 M/UL — SIGNIFICANT CHANGE UP (ref 3.8–5.2)
RBC # BLD: 4.03 M/UL — SIGNIFICANT CHANGE UP (ref 3.8–5.2)
RBC # BLD: 4.25 M/UL — SIGNIFICANT CHANGE UP (ref 3.8–5.2)
RBC # FLD: 13.5 % — SIGNIFICANT CHANGE UP (ref 10.3–14.5)
SODIUM SERPL-SCNC: 138 MMOL/L — SIGNIFICANT CHANGE UP (ref 135–145)
WBC # BLD: 10.45 K/UL — SIGNIFICANT CHANGE UP (ref 3.8–10.5)
WBC # BLD: 12.61 K/UL — HIGH (ref 3.8–10.5)
WBC # BLD: 16.14 K/UL — HIGH (ref 3.8–10.5)
WBC # FLD AUTO: 10.45 K/UL — SIGNIFICANT CHANGE UP (ref 3.8–10.5)
WBC # FLD AUTO: 12.61 K/UL — HIGH (ref 3.8–10.5)
WBC # FLD AUTO: 16.14 K/UL — HIGH (ref 3.8–10.5)

## 2021-06-14 PROCEDURE — 88302 TISSUE EXAM BY PATHOLOGIST: CPT | Mod: 26

## 2021-06-14 PROCEDURE — 74176 CT ABD & PELVIS W/O CONTRAST: CPT | Mod: 26

## 2021-06-14 PROCEDURE — 58260 VAGINAL HYSTERECTOMY: CPT

## 2021-06-14 PROCEDURE — 57260 CMBN ANT PST COLPRHY: CPT | Mod: 80

## 2021-06-14 PROCEDURE — 58260 VAGINAL HYSTERECTOMY: CPT | Mod: 80

## 2021-06-14 PROCEDURE — 99221 1ST HOSP IP/OBS SF/LOW 40: CPT

## 2021-06-14 PROCEDURE — 88305 TISSUE EXAM BY PATHOLOGIST: CPT | Mod: 26

## 2021-06-14 PROCEDURE — 57260 CMBN ANT PST COLPRHY: CPT

## 2021-06-14 RX ORDER — FENTANYL CITRATE 50 UG/ML
25 INJECTION INTRAVENOUS
Refills: 0 | Status: DISCONTINUED | OUTPATIENT
Start: 2021-06-14 | End: 2021-06-15

## 2021-06-14 RX ORDER — CHOLECALCIFEROL (VITAMIN D3) 125 MCG
1 CAPSULE ORAL
Qty: 0 | Refills: 0 | DISCHARGE

## 2021-06-14 RX ORDER — SODIUM CHLORIDE 9 MG/ML
1000 INJECTION, SOLUTION INTRAVENOUS
Refills: 0 | Status: DISCONTINUED | OUTPATIENT
Start: 2021-06-14 | End: 2021-06-14

## 2021-06-14 RX ORDER — GENTAMICIN SULFATE 40 MG/ML
230 VIAL (ML) INJECTION ONCE
Refills: 0 | Status: COMPLETED | OUTPATIENT
Start: 2021-06-14 | End: 2021-06-14

## 2021-06-14 RX ORDER — SODIUM CHLORIDE 9 MG/ML
500 INJECTION, SOLUTION INTRAVENOUS ONCE
Refills: 0 | Status: COMPLETED | OUTPATIENT
Start: 2021-06-14 | End: 2021-06-14

## 2021-06-14 RX ORDER — PHENYLEPHRINE HYDROCHLORIDE 10 MG/ML
0.3 INJECTION INTRAVENOUS
Qty: 40 | Refills: 0 | Status: DISCONTINUED | OUTPATIENT
Start: 2021-06-14 | End: 2021-06-14

## 2021-06-14 RX ORDER — IBUPROFEN 200 MG
600 TABLET ORAL EVERY 6 HOURS
Refills: 0 | Status: DISCONTINUED | OUTPATIENT
Start: 2021-06-14 | End: 2021-06-15

## 2021-06-14 RX ORDER — ALBUMIN HUMAN 25 %
250 VIAL (ML) INTRAVENOUS ONCE
Refills: 0 | Status: COMPLETED | OUTPATIENT
Start: 2021-06-14 | End: 2021-06-14

## 2021-06-14 RX ORDER — SODIUM CHLORIDE 9 MG/ML
3 INJECTION INTRAMUSCULAR; INTRAVENOUS; SUBCUTANEOUS EVERY 8 HOURS
Refills: 0 | Status: DISCONTINUED | OUTPATIENT
Start: 2021-06-14 | End: 2021-06-14

## 2021-06-14 RX ORDER — ROSUVASTATIN CALCIUM 5 MG/1
1 TABLET ORAL
Qty: 0 | Refills: 0 | DISCHARGE

## 2021-06-14 RX ORDER — SODIUM CHLORIDE 9 MG/ML
1000 INJECTION, SOLUTION INTRAVENOUS
Refills: 0 | Status: DISCONTINUED | OUTPATIENT
Start: 2021-06-14 | End: 2021-06-15

## 2021-06-14 RX ORDER — L.ACIDOPH/B.ANIMALIS/B.LONGUM 15B CELL
1 CAPSULE ORAL
Qty: 0 | Refills: 0 | DISCHARGE

## 2021-06-14 RX ORDER — SODIUM CHLORIDE 9 MG/ML
1000 INJECTION, SOLUTION INTRAVENOUS ONCE
Refills: 0 | Status: COMPLETED | OUTPATIENT
Start: 2021-06-14 | End: 2021-06-14

## 2021-06-14 RX ORDER — HYDROMORPHONE HYDROCHLORIDE 2 MG/ML
0.5 INJECTION INTRAMUSCULAR; INTRAVENOUS; SUBCUTANEOUS
Refills: 0 | Status: DISCONTINUED | OUTPATIENT
Start: 2021-06-14 | End: 2021-06-14

## 2021-06-14 RX ORDER — LEVOTHYROXINE SODIUM 125 MCG
1 TABLET ORAL
Qty: 0 | Refills: 0 | DISCHARGE

## 2021-06-14 RX ORDER — LEVOTHYROXINE SODIUM 125 MCG
75 TABLET ORAL DAILY
Refills: 0 | Status: DISCONTINUED | OUTPATIENT
Start: 2021-06-14 | End: 2021-06-15

## 2021-06-14 RX ORDER — KETOROLAC TROMETHAMINE 30 MG/ML
30 SYRINGE (ML) INJECTION EVERY 6 HOURS
Refills: 0 | Status: DISCONTINUED | OUTPATIENT
Start: 2021-06-14 | End: 2021-06-15

## 2021-06-14 RX ORDER — OXYCODONE HYDROCHLORIDE 5 MG/1
5 TABLET ORAL ONCE
Refills: 0 | Status: DISCONTINUED | OUTPATIENT
Start: 2021-06-14 | End: 2021-06-15

## 2021-06-14 RX ORDER — METRONIDAZOLE 500 MG
500 TABLET ORAL ONCE
Refills: 0 | Status: COMPLETED | OUTPATIENT
Start: 2021-06-14 | End: 2021-06-14

## 2021-06-14 RX ORDER — ACETAMINOPHEN 500 MG
975 TABLET ORAL
Refills: 0 | Status: DISCONTINUED | OUTPATIENT
Start: 2021-06-14 | End: 2021-06-15

## 2021-06-14 RX ORDER — LIFITEGRAST 50 MG/ML
1 SOLUTION/ DROPS OPHTHALMIC
Qty: 0 | Refills: 0 | DISCHARGE

## 2021-06-14 RX ORDER — PROCHLORPERAZINE MALEATE 5 MG
10 TABLET ORAL ONCE
Refills: 0 | Status: DISCONTINUED | OUTPATIENT
Start: 2021-06-14 | End: 2021-06-15

## 2021-06-14 RX ORDER — OXYCODONE HYDROCHLORIDE 5 MG/1
5 TABLET ORAL
Refills: 0 | Status: DISCONTINUED | OUTPATIENT
Start: 2021-06-14 | End: 2021-06-15

## 2021-06-14 RX ADMIN — Medication 30 MILLIGRAM(S): at 23:15

## 2021-06-14 RX ADMIN — Medication 30 MILLIGRAM(S): at 18:52

## 2021-06-14 RX ADMIN — Medication 200 MILLIGRAM(S): at 08:00

## 2021-06-14 RX ADMIN — Medication 975 MILLIGRAM(S): at 23:15

## 2021-06-14 RX ADMIN — SODIUM CHLORIDE 1000 MILLILITER(S): 9 INJECTION, SOLUTION INTRAVENOUS at 10:07

## 2021-06-14 RX ADMIN — Medication 975 MILLIGRAM(S): at 14:57

## 2021-06-14 RX ADMIN — Medication 125 MILLILITER(S): at 15:11

## 2021-06-14 RX ADMIN — Medication 975 MILLIGRAM(S): at 22:35

## 2021-06-14 RX ADMIN — Medication 975 MILLIGRAM(S): at 15:45

## 2021-06-14 RX ADMIN — Medication 300 MILLIGRAM(S): at 08:05

## 2021-06-14 RX ADMIN — SODIUM CHLORIDE 500 MILLILITER(S): 9 INJECTION, SOLUTION INTRAVENOUS at 10:30

## 2021-06-14 RX ADMIN — PHENYLEPHRINE HYDROCHLORIDE 7.09 MICROGRAM(S)/KG/MIN: 10 INJECTION INTRAVENOUS at 11:12

## 2021-06-14 RX ADMIN — Medication 30 MILLIGRAM(S): at 22:35

## 2021-06-14 NOTE — CONSULT NOTE ADULT - SUBJECTIVE AND OBJECTIVE BOX
SURGICAL INTENSIVE CARE CONSULT    HPI:  61 year old female with history of HLD, hypothyroidism and chronically dry eyes present today for PST c/o a "lump in my vaginal area". Patient states while  remolding her kitchen and lifting heavy items November 2020 she felt pelvic pressure and lump in her vaginal area. States when she went for her annual GYN, she was informed she had a "prolapse". Patient states a Pessary was inserted, but reports it was very uncomfortable and had it removed. States other options were discussed and she decided on the hysterectomy Denies pelvic pain, pelvic pressure, incontinence or change in bowel or bladder function. Denies nausea, vomiting, fever, chills, chest pain or palpitations. Now scheduled for total vaginal abdominal hysterectomy, anterior/posterior repair with Dr. Oropeza on 6/14/21 pending medical clearance.    (08 Jun 2021 13:43)      PAST MEDICAL HISTORY:  Chronically dry eyes, bilateral    Hypothyroidism    Hyperlipidemia    Pre-diabetes    COVID-19 vaccine series completed        PAST SURGICAL HISTORY:  History of tonsillectomy        ALLERGIES:  penicillin (Hives; Vomiting)  pravastatin (Muscle Pain)      FAMILY HISTORY:    SOCIAL HISTORY:    HOME MEDICATIONS:    MEDICATIONS  (STANDING):  acetaminophen   Tablet .. 975 milliGRAM(s) Oral <User Schedule>  ibuprofen  Tablet. 600 milliGRAM(s) Oral every 6 hours  ketorolac   Injectable 30 milliGRAM(s) IV Push every 6 hours  lactated ringers. 1000 milliLiter(s) (125 mL/Hr) IV Continuous <Continuous>  lactated ringers. 1000 milliLiter(s) (75 mL/Hr) IV Continuous <Continuous>  phenylephrine    Infusion 0.3 MICROgram(s)/kG/Min (7.09 mL/Hr) IV Continuous <Continuous>    MEDICATIONS  (PRN):  fentaNYL    Injectable 25 MICROGram(s) IV Push every 5 minutes PRN Severe Pain (7 - 10)  HYDROmorphone  Injectable 0.5 milliGRAM(s) IV Push every 10 minutes PRN Moderate Pain (4 - 6)  oxyCODONE    IR 5 milliGRAM(s) Oral every 3 hours PRN Moderate to Severe Pain (4-10)  oxyCODONE    IR 5 milliGRAM(s) Oral once PRN Moderate to Severe Pain (4-10)  prochlorperazine   IVPB 10 milliGRAM(s) IV Intermittent once PRN nausea / vomiting      VITALS & I/Os:  Vital Signs Last 24 Hrs  T(C): 36.2 (14 Jun 2021 14:00), Max: 36.7 (14 Jun 2021 05:50)  T(F): 97.1 (14 Jun 2021 14:00), Max: 98 (14 Jun 2021 05:50)  HR: 75 (14 Jun 2021 14:15) (70 - 78)  BP: 91/55 (14 Jun 2021 14:15) (73/53 - 126/76)  BP(mean): 67 (14 Jun 2021 14:15) (60 - 74)  RR: 18 (14 Jun 2021 14:15) (14 - 18)  SpO2: 97% (14 Jun 2021 14:15) (96% - 100%)  CAPILLARY BLOOD GLUCOSE      POCT Blood Glucose.: 144 mg/dL (14 Jun 2021 09:19)  POCT Blood Glucose.: 132 mg/dL (14 Jun 2021 08:33)  POCT Blood Glucose.: 115 mg/dL (14 Jun 2021 06:07)      I&O's Summary    14 Jun 2021 07:01  -  14 Jun 2021 14:26  --------------------------------------------------------  IN: 1891.5 mL / OUT: 1200 mL / NET: 691.5 mL        GENERAL: Alert, well developed, in no acute distress.  MENTAL STATUS: AAOx3. Appropriate affect.  HEENT: PERRLA. EOMI. MMM.  Trachea midline. No lymph node swelling or tenderness.  RESPIRATORY: CTAB. No wheezing, rales or rhonchi.  CARDIOVASCULAR: RRR. No audible murmurs, rubs or gallops.   GASTROINTESTINAL: Abdomen soft, NT, ND, -R/-G.  No pulsatile mass, no flank tenderness or suprapubic tenderness. No hepatosplenomegaly.  NEUROLOGIC: Cranial nerves II-XII grossly intact. No focal neurological deficits. Moves all extremities spontaneously. Sensation intact bilaterally.  INTGUMENTARY: No overt rashes or lesions, petechia or purpura. Good turgor. No edema.  MUSCULOSKELETAL: No cyanosis or clubbing. No gross deformities.   LYMPHATIC: Palpation of neck reveals no swelling or tenderness of neck nodes. Palpation of groin reveals no swelling or tenderness of groin nodes.    LABS:                        12.8   16.14 )-----------( 275      ( 14 Jun 2021 12:44 )             38.7     06-14    138  |  105  |  14.6  ----------------------------<  182<H>  3.9   |  21.0<L>  |  0.47<L>    Ca    8.3<L>      14 Jun 2021 11:08      Lactate: acetaminophen   Tablet .. 975 milliGRAM(s) Oral <User Schedule>  fentaNYL    Injectable 25 MICROGram(s) IV Push every 5 minutes PRN  HYDROmorphone  Injectable 0.5 milliGRAM(s) IV Push every 10 minutes PRN  ibuprofen  Tablet. 600 milliGRAM(s) Oral every 6 hours  ketorolac   Injectable 30 milliGRAM(s) IV Push every 6 hours  lactated ringers. 1000 milliLiter(s) IV Continuous <Continuous>  lactated ringers. 1000 milliLiter(s) IV Continuous <Continuous>  oxyCODONE    IR 5 milliGRAM(s) Oral every 3 hours PRN  oxyCODONE    IR 5 milliGRAM(s) Oral once PRN  phenylephrine    Infusion 0.3 MICROgram(s)/kG/Min IV Continuous <Continuous>  prochlorperazine   IVPB 10 milliGRAM(s) IV Intermittent once PRN     PT/INR - ( 14 Jun 2021 11:08 )   PT: 12.6 sec;   INR: 1.09 ratio         PTT - ( 14 Jun 2021 11:08 )  PTT:32.1 sec      IMAGING:    CT Abdomen and Pelvis No Cont (06.14.21 @ 14:02)  FINDINGS:  LOWER CHEST: Within normal limits.    LIVER: Within normal limits.  BILE DUCTS: Normal caliber.  GALLBLADDER: Within normal limits.  SPLEEN: Within normal limits.  PANCREAS: Within normal limits.  ADRENALS: Within normal limits.  KIDNEYS/URETERS: Within normal limits.    BLADDER: There is a Cabrera catheter in the collapsed bladder.  REPRODUCTIVE ORGANS: The uterus has been removed. The adnexa are unremarkable by CT criteria. There is trace hyperattenuating fluid in the pelvis, compatible with minimal postoperative hematoma. There is hyperattenuating packing material in the vagina.    BOWEL: No bowel obstruction. Appendix is normal.  PERITONEUM: There is no evidence of hyperattenuating collection in the retroperitoneum to suggest hemorrhage. There is no evidence of free intraperitoneal gas.  VESSELS: Within normal limits.  RETROPERITONEUM/LYMPH NODES: No lymphadenopathy.  ABDOMINAL WALL: Within normal limits.  BONES: Within normal limits.    IMPRESSION: Minimal postsurgical hematoma in the pelvis. No evidence of retroperitoneal hemorrhage.   SURGICAL INTENSIVE CARE CONSULT    HPI:  61 year old female with history of HLD, hypothyroidism and chronically dry eyes and hx of vaginal prolapse, presents today for elective vaginal hysterectomy and anterior/posterior vaginal repair. SICU consulted for post-op hypotension requiring pressors, neosynephrine. Patient's EBL in the case was 150mL, preop Hg 15 post op Hg 12.8    PAST MEDICAL HISTORY:  Chronically dry eyes, bilateral  Hypothyroidism  Hyperlipidemia  Pre-diabetes  COVID-19 vaccine series completed    PAST SURGICAL HISTORY:  History of tonsillectomy    ALLERGIES:  penicillin (Hives; Vomiting)  pravastatin (Muscle Pain)      FAMILY HISTORY:    SOCIAL HISTORY:    HOME MEDICATIONS:    MEDICATIONS  (STANDING):  acetaminophen   Tablet .. 975 milliGRAM(s) Oral <User Schedule>  ibuprofen  Tablet. 600 milliGRAM(s) Oral every 6 hours  ketorolac   Injectable 30 milliGRAM(s) IV Push every 6 hours  lactated ringers. 1000 milliLiter(s) (125 mL/Hr) IV Continuous <Continuous>  lactated ringers. 1000 milliLiter(s) (75 mL/Hr) IV Continuous <Continuous>  phenylephrine    Infusion 0.3 MICROgram(s)/kG/Min (7.09 mL/Hr) IV Continuous <Continuous>    MEDICATIONS  (PRN):  fentaNYL    Injectable 25 MICROGram(s) IV Push every 5 minutes PRN Severe Pain (7 - 10)  HYDROmorphone  Injectable 0.5 milliGRAM(s) IV Push every 10 minutes PRN Moderate Pain (4 - 6)  oxyCODONE    IR 5 milliGRAM(s) Oral every 3 hours PRN Moderate to Severe Pain (4-10)  oxyCODONE    IR 5 milliGRAM(s) Oral once PRN Moderate to Severe Pain (4-10)  prochlorperazine   IVPB 10 milliGRAM(s) IV Intermittent once PRN nausea / vomiting      VITALS & I/Os:  Vital Signs Last 24 Hrs  T(C): 36.2 (14 Jun 2021 14:00), Max: 36.7 (14 Jun 2021 05:50)  T(F): 97.1 (14 Jun 2021 14:00), Max: 98 (14 Jun 2021 05:50)  HR: 75 (14 Jun 2021 14:15) (70 - 78)  BP: 91/55 (14 Jun 2021 14:15) (73/53 - 126/76)  BP(mean): 67 (14 Jun 2021 14:15) (60 - 74)  RR: 18 (14 Jun 2021 14:15) (14 - 18)  SpO2: 97% (14 Jun 2021 14:15) (96% - 100%)  CAPILLARY BLOOD GLUCOSE      POCT Blood Glucose.: 144 mg/dL (14 Jun 2021 09:19)  POCT Blood Glucose.: 132 mg/dL (14 Jun 2021 08:33)  POCT Blood Glucose.: 115 mg/dL (14 Jun 2021 06:07)      I&O's Summary    14 Jun 2021 07:01  -  14 Jun 2021 14:26  --------------------------------------------------------  IN: 1891.5 mL / OUT: 1200 mL / NET: 691.5 mL        GENERAL: Alert, well developed, in no acute distress.  MENTAL STATUS: AAOx3. Appropriate affect.  HEENT: PERRLA. EOMI. MMM.  Trachea midline.   RESPIRATORY: CTAB. non-labored breathing or conversational dyspnea  CARDIOVASCULAR: RRR. No audible murmurs, rubs or gallops.   GASTROINTESTINAL: Abdomen soft, NT, ND, -R/-G.    INTGUMENTARY: No overt rashes or lesions, petechia or purpura. Good turgor. No edema.  MUSCULOSKELETAL: No cyanosis or clubbing. No gross deformities.       LABS:                        12.8   16.14 )-----------( 275      ( 14 Jun 2021 12:44 )             38.7     06-14    138  |  105  |  14.6  ----------------------------<  182<H>  3.9   |  21.0<L>  |  0.47<L>    Ca    8.3<L>      14 Jun 2021 11:08      Lactate: acetaminophen   Tablet .. 975 milliGRAM(s) Oral <User Schedule>  fentaNYL    Injectable 25 MICROGram(s) IV Push every 5 minutes PRN  HYDROmorphone  Injectable 0.5 milliGRAM(s) IV Push every 10 minutes PRN  ibuprofen  Tablet. 600 milliGRAM(s) Oral every 6 hours  ketorolac   Injectable 30 milliGRAM(s) IV Push every 6 hours  lactated ringers. 1000 milliLiter(s) IV Continuous <Continuous>  lactated ringers. 1000 milliLiter(s) IV Continuous <Continuous>  oxyCODONE    IR 5 milliGRAM(s) Oral every 3 hours PRN  oxyCODONE    IR 5 milliGRAM(s) Oral once PRN  phenylephrine    Infusion 0.3 MICROgram(s)/kG/Min IV Continuous <Continuous>  prochlorperazine   IVPB 10 milliGRAM(s) IV Intermittent once PRN     PT/INR - ( 14 Jun 2021 11:08 )   PT: 12.6 sec;   INR: 1.09 ratio         PTT - ( 14 Jun 2021 11:08 )  PTT:32.1 sec      IMAGING:    CT Abdomen and Pelvis No Cont (06.14.21 @ 14:02)  FINDINGS:  LOWER CHEST: Within normal limits.    LIVER: Within normal limits.  BILE DUCTS: Normal caliber.  GALLBLADDER: Within normal limits.  SPLEEN: Within normal limits.  PANCREAS: Within normal limits.  ADRENALS: Within normal limits.  KIDNEYS/URETERS: Within normal limits.    BLADDER: There is a Cabrera catheter in the collapsed bladder.  REPRODUCTIVE ORGANS: The uterus has been removed. The adnexa are unremarkable by CT criteria. There is trace hyperattenuating fluid in the pelvis, compatible with minimal postoperative hematoma. There is hyperattenuating packing material in the vagina.    BOWEL: No bowel obstruction. Appendix is normal.  PERITONEUM: There is no evidence of hyperattenuating collection in the retroperitoneum to suggest hemorrhage. There is no evidence of free intraperitoneal gas.  VESSELS: Within normal limits.  RETROPERITONEUM/LYMPH NODES: No lymphadenopathy.  ABDOMINAL WALL: Within normal limits.  BONES: Within normal limits.    IMPRESSION: Minimal postsurgical hematoma in the pelvis. No evidence of retroperitoneal hemorrhage.

## 2021-06-14 NOTE — PROGRESS NOTE ADULT - SUBJECTIVE AND OBJECTIVE BOX
RANJEET WOODS is a 60yo now POD#0 s/p vaginal hysterectomy and AP repair for uterine prolapse, uncomplicated EBL 150cc.     S:    Patient was seen and examined at bedside during episodes of hypotension. Patient was noted to have blood pressures in the 70s/40s with a MAP of 40s-50s. Anesthesia    Patient has no complaints.   Pain is controlled with current treatment regimen.   Has not yet attempted a PO diet, denies N/V.   Has not yet ambulated.  -flatus/-BM/rebolledo in place  Patient denies lightheadedness, dizziness, palpitations, shortness of breath and chest pain.     O:   T(C): 36.5 (06-14-21 @ 15:00), Max: 36.7 (06-14-21 @ 05:50)  HR: 68 (06-14-21 @ 15:20) (68 - 78)  BP: 89/57 (06-14-21 @ 15:20) (73/53 - 126/76)  RR: 15 (06-14-21 @ 15:20) (14 - 18)  SpO2: 98% (06-14-21 @ 15:20) (96% - 100%)    Gen: NAD, AOx3  CV: RRR  Pulm: CTAB  Abdomen: Soft, nondistended, appropriately tender, + BS   Incision: Clean, dry and intact  Extremities: No calf tenderness or edema     Labs:       06-14-21 @ 07:01  -  06-14-21 @ 15:31  --------------------------------------------------------  IN: 2016.5 mL / OUT: 1325 mL / NET: 691.5 mL            A/P: 60yo now POD#0 s/p vaginal hysterectomy and AP repair for uterine prolapse, uncomplicated EBL 150cc.   60yo now POD# s/p   Gen: VSS  Neuro: Pain well controlled on current regimen  CV:  Pulm: Incentive spirometer use encouraged  GI: Bowel sounds/function normal, tolerating PO diet  : Rebolledo removed, voiding spontaneously  Heme: AM labs pending  DVT ppx: Ambulation encouraged, SCDs when in bed, lovenox  Dispo: Will discuss with attending     RANJEET WOODS is a 62yo now POD#0 s/p vaginal hysterectomy and AP repair for uterine prolapse, uncomplicated EBL 150cc.     S:    Patient was seen and examined at bedside during episodes of hypotension. Patient was noted to have blood pressures in the 70s/40s with a MAP of 40s-50s. Anesthesia at bedside. Patient received 800cc IVF intraop, 1500cc bolus in PACU and started on a phenylephrine drip. Patient's blood pressures improved to the 90s/60s with MAPs of 60s. Patient was seen by SICU. Patient has no complaints and is completely asymptomatic. Patient denies lightheadedness, dizziness, palpitations, shortness of breath and chest pain. Pain is controlled with current treatment regimen. She has not yet attempted a PO diet, denies N/V. She has not yet ambulated. -flatus/-BM/rebolledo in place    O:   T(C): 36.5 (06-14-21 @ 15:00), Max: 36.7 (06-14-21 @ 05:50)  HR: 68 (06-14-21 @ 15:20) (68 - 78)  BP: 89/57 (06-14-21 @ 15:20) (73/53 - 126/76)  RR: 15 (06-14-21 @ 15:20) (14 - 18)  SpO2: 98% (06-14-21 @ 15:20) (96% - 100%)    Gen: NAD, AOx3  CV: RRR  Pulm: CTAB  Abdomen: Soft, nondistended, appropriately tender, + BS, no rebound or guarding   Pelvic: Packing in place, no active vaginal bleeding   Extremities: No calf tenderness or edema     Labs:                         12.8   16.14 )-----------( 275      ( 14 Jun 2021 12:44 )             38.7       06-14-21 @ 07:01  -  06-14-21 @ 15:31  --------------------------------------------------------  IN: 2016.5 mL / OUT: 1325 mL / NET: 691.5 mL    UOP: 350cc since surgery     < from: CT Abdomen and Pelvis No Cont (06.14.21 @ 14:02) >  IMPRESSION: Minimal postsurgical hematoma in the pelvis. No evidence of retroperitoneal hemorrhage.    < end of copied text >       RANJEET WOODS is a 60yo now POD#0 s/p vaginal hysterectomy and AP repair for uterine prolapse, uncomplicated EBL 150cc.     S:    Patient was seen and examined at bedside during episodes of hypotension. Patient was noted to have blood pressures in the 70s/40s with a MAP of 40s-50s. Anesthesia at bedside. Patient received 800cc IVF intraop, 1500cc bolus in PACU and started on a phenylephrine drip. Patient's blood pressures improved to the 90s/60s with MAPs of 60s. Patient was seen by SICU. Patient has no complaints and is completely asymptomatic. Patient denies lightheadedness, dizziness, palpitations, shortness of breath and chest pain. Pain is controlled with current treatment regimen. She has not yet attempted a PO diet, denies N/V. She has not yet ambulated. -flatus/-BM/rebolledo in place    O:   T(C): 36.5 (06-14-21 @ 15:00), Max: 36.7 (06-14-21 @ 05:50)  HR: 68 (06-14-21 @ 15:20) (68 - 78)  BP: 89/57 (06-14-21 @ 15:20) (73/53 - 126/76)  RR: 15 (06-14-21 @ 15:20) (14 - 18)  SpO2: 98% (06-14-21 @ 15:20) (96% - 100%)    Gen: NAD, AOx3  CV: RRR  Pulm: CTAB   Abdomen: Soft, nondistended, appropriately tender, + BS, no rebound or guarding   Pelvic: Packing in place, no active vaginal bleeding   Extremities: No calf tenderness or edema     Labs:                         12.8   16.14 )-----------( 275      ( 14 Jun 2021 12:44 )             38.7       06-14-21 @ 07:01  -  06-14-21 @ 15:31  --------------------------------------------------------  IN: 2016.5 mL / OUT: 1325 mL / NET: 691.5 mL    UOP: 350cc since surgery     < from: CT Abdomen and Pelvis No Cont (06.14.21 @ 14:02) >  IMPRESSION: Minimal postsurgical hematoma in the pelvis. No evidence of retroperitoneal hemorrhage.    < end of copied text >

## 2021-06-14 NOTE — BRIEF OPERATIVE NOTE - NSICDXBRIEFPOSTOP_GEN_ALL_CORE_FT
POST-OP DIAGNOSIS:  Cystocele or rectocele with uterine prolapse 14-Jun-2021 09:15:11  Radha Oropeza

## 2021-06-14 NOTE — PROGRESS NOTE ADULT - ASSESSMENT
A/P: 62yo now POD#0 s/p vaginal hysterectomy and AP repair for uterine prolapse, uncomplicated surgery. Postoperative course significant for hypotension requiring a phenylephrine drip. Patient is no off of phenylephrine drip and maintaining appropriate MAPs. Hypotension likely 2/2 anesthesia side effects.    Gen: Asymptomatic. Hypotensive and normocardic, MAP in the 60s-70s s/p phenylephrine drip, now completely weaned off. SICU consulted, recommendations appreciated.   Neuro: Pain well controlled on current regimen.   CV: Normocardic and NSR.   Pulm: Incentive spirometer use encouraged. No concerns.   GI: Bowel sounds present, PE benign without concern for hemoperitoneum at this time. She has not yet attempted a PO diet, will advance as tolerated. Albumin given as per SICU recommendations.   : Creatinine 0.47. Acbrera in place, clear urine. UOP adequate.   Heme: Hgb 15 -> 11.9 -> 12.8 -> AM CBC. Hypotension secondary to acute blood less likely given hemoglobin is not downtrending, PE benign, no vaginal bleeding, normocardic and CT A/P without retroperitoneal hemorrhage present.   Endo: PMH hypothyroidism. No concerns. Continue levothyroxine 75mcg daily.   ID: Afebrile. WBC 12 -> 16 -> AM CBC. No concerns.   DVT ppx: Ambulation encouraged, SCDs when in bed  Dispo: Patient to be discharged when meeting all postoperative milestones and pending attending approval.

## 2021-06-14 NOTE — CONSULT NOTE ADULT - ATTENDING COMMENTS
Pt seen and examined with resident. Pt GCS 15, no complaints, no dizziness, MAP >65 off pressors. No evidence of post operative hemorrhage. No current indication for critical care. Please call with questions

## 2021-06-14 NOTE — BRIEF OPERATIVE NOTE - NSICDXBRIEFPREOP_GEN_ALL_CORE_FT
PRE-OP DIAGNOSIS:  Cystocele with uterine prolapse 14-Jun-2021 09:13:19  Radha Oropeza  Rectocele 14-Jun-2021 09:13:43  Radha Oropeza  Cystocele or rectocele with uterine prolapse 14-Jun-2021 09:14:16  Radha Oropeza

## 2021-06-14 NOTE — BRIEF OPERATIVE NOTE - NSICDXBRIEFPROCEDURE_GEN_ALL_CORE_FT
PROCEDURES:  Vaginal hysterectomy with cystoscopy if indicated 14-Jun-2021 09:12:02  Radha Oropeza  Anterior and posterior vaginal repair 14-Jun-2021 09:12:21  Radha Oropeza

## 2021-06-14 NOTE — CONSULT NOTE ADULT - ASSESSMENT
Patient is a 62yo F with history of vaginal prolapse now s/p vaginal hysterectomy and anterior/posterior repair requiring pressor neosynephrine post op for hypotension. Patient already received 2.7L of IVF and weaned off 0.2 Mehul, she is mentating well and asymptomatic, abdominal exam benign. CT pelvis performed with minimal postop hematoma in pelvis.     Neuro: awake and alert. Avoid narcotics for pain control.  CV: Hgb 15 -> 12.8, NSR, given one dose of albumin and has been weaned off Mehul   Pulm: no issue on RA  GI/Nutrition: Abdomen benign, non peritonitic   /Renal: adequate UOP, Cabrera with clear urine   ID: Afebrile  Endo: wnl  Heme/DVT Prophylaxis: SCDs  Dispo: no critical care need

## 2021-06-15 ENCOUNTER — TRANSCRIPTION ENCOUNTER (OUTPATIENT)
Age: 61
End: 2021-06-15

## 2021-06-15 VITALS
DIASTOLIC BLOOD PRESSURE: 58 MMHG | RESPIRATION RATE: 16 BRPM | SYSTOLIC BLOOD PRESSURE: 95 MMHG | HEART RATE: 79 BPM | TEMPERATURE: 99 F | OXYGEN SATURATION: 98 %

## 2021-06-15 LAB
ANION GAP SERPL CALC-SCNC: 10 MMOL/L — SIGNIFICANT CHANGE UP (ref 5–17)
BASOPHILS # BLD AUTO: 0.01 K/UL — SIGNIFICANT CHANGE UP (ref 0–0.2)
BASOPHILS NFR BLD AUTO: 0.1 % — SIGNIFICANT CHANGE UP (ref 0–2)
BUN SERPL-MCNC: 11.7 MG/DL — SIGNIFICANT CHANGE UP (ref 8–20)
CALCIUM SERPL-MCNC: 8.6 MG/DL — SIGNIFICANT CHANGE UP (ref 8.6–10.2)
CHLORIDE SERPL-SCNC: 107 MMOL/L — SIGNIFICANT CHANGE UP (ref 98–107)
CO2 SERPL-SCNC: 26 MMOL/L — SIGNIFICANT CHANGE UP (ref 22–29)
COVID-19 SPIKE DOMAIN AB INTERP: POSITIVE
COVID-19 SPIKE DOMAIN ANTIBODY RESULT: >250 U/ML — HIGH
CREAT SERPL-MCNC: 0.59 MG/DL — SIGNIFICANT CHANGE UP (ref 0.5–1.3)
EOSINOPHIL # BLD AUTO: 0.01 K/UL — SIGNIFICANT CHANGE UP (ref 0–0.5)
EOSINOPHIL NFR BLD AUTO: 0.1 % — SIGNIFICANT CHANGE UP (ref 0–6)
GLUCOSE SERPL-MCNC: 107 MG/DL — HIGH (ref 70–99)
HCT VFR BLD CALC: 31.5 % — LOW (ref 34.5–45)
HGB BLD-MCNC: 10.5 G/DL — LOW (ref 11.5–15.5)
IMM GRANULOCYTES NFR BLD AUTO: 0.3 % — SIGNIFICANT CHANGE UP (ref 0–1.5)
LYMPHOCYTES # BLD AUTO: 1.66 K/UL — SIGNIFICANT CHANGE UP (ref 1–3.3)
LYMPHOCYTES # BLD AUTO: 15 % — SIGNIFICANT CHANGE UP (ref 13–44)
MCHC RBC-ENTMCNC: 29.9 PG — SIGNIFICANT CHANGE UP (ref 27–34)
MCHC RBC-ENTMCNC: 33.3 GM/DL — SIGNIFICANT CHANGE UP (ref 32–36)
MCV RBC AUTO: 89.7 FL — SIGNIFICANT CHANGE UP (ref 80–100)
MONOCYTES # BLD AUTO: 0.92 K/UL — HIGH (ref 0–0.9)
MONOCYTES NFR BLD AUTO: 8.3 % — SIGNIFICANT CHANGE UP (ref 2–14)
NEUTROPHILS # BLD AUTO: 8.45 K/UL — HIGH (ref 1.8–7.4)
NEUTROPHILS NFR BLD AUTO: 76.2 % — SIGNIFICANT CHANGE UP (ref 43–77)
PLATELET # BLD AUTO: 224 K/UL — SIGNIFICANT CHANGE UP (ref 150–400)
POTASSIUM SERPL-MCNC: 4.1 MMOL/L — SIGNIFICANT CHANGE UP (ref 3.5–5.3)
POTASSIUM SERPL-SCNC: 4.1 MMOL/L — SIGNIFICANT CHANGE UP (ref 3.5–5.3)
RBC # BLD: 3.51 M/UL — LOW (ref 3.8–5.2)
RBC # FLD: 14.1 % — SIGNIFICANT CHANGE UP (ref 10.3–14.5)
SARS-COV-2 IGG+IGM SERPL QL IA: >250 U/ML — HIGH
SARS-COV-2 IGG+IGM SERPL QL IA: POSITIVE
SODIUM SERPL-SCNC: 143 MMOL/L — SIGNIFICANT CHANGE UP (ref 135–145)
WBC # BLD: 11.08 K/UL — HIGH (ref 3.8–10.5)
WBC # FLD AUTO: 11.08 K/UL — HIGH (ref 3.8–10.5)

## 2021-06-15 PROCEDURE — P9045: CPT

## 2021-06-15 PROCEDURE — 86769 SARS-COV-2 COVID-19 ANTIBODY: CPT

## 2021-06-15 PROCEDURE — 88302 TISSUE EXAM BY PATHOLOGIST: CPT

## 2021-06-15 PROCEDURE — 85025 COMPLETE CBC W/AUTO DIFF WBC: CPT

## 2021-06-15 PROCEDURE — 82962 GLUCOSE BLOOD TEST: CPT

## 2021-06-15 PROCEDURE — 85730 THROMBOPLASTIN TIME PARTIAL: CPT

## 2021-06-15 PROCEDURE — 88305 TISSUE EXAM BY PATHOLOGIST: CPT

## 2021-06-15 PROCEDURE — 36415 COLL VENOUS BLD VENIPUNCTURE: CPT

## 2021-06-15 PROCEDURE — 74176 CT ABD & PELVIS W/O CONTRAST: CPT

## 2021-06-15 PROCEDURE — 80048 BASIC METABOLIC PNL TOTAL CA: CPT

## 2021-06-15 PROCEDURE — 85027 COMPLETE CBC AUTOMATED: CPT

## 2021-06-15 PROCEDURE — 85610 PROTHROMBIN TIME: CPT

## 2021-06-15 RX ORDER — IBUPROFEN 200 MG
1 TABLET ORAL
Qty: 20 | Refills: 0
Start: 2021-06-15 | End: 2021-06-19

## 2021-06-15 RX ORDER — ACETAMINOPHEN 500 MG
3 TABLET ORAL
Qty: 60 | Refills: 0
Start: 2021-06-15 | End: 2021-06-19

## 2021-06-15 RX ADMIN — Medication 30 MILLIGRAM(S): at 11:20

## 2021-06-15 RX ADMIN — Medication 975 MILLIGRAM(S): at 03:00

## 2021-06-15 RX ADMIN — Medication 75 MICROGRAM(S): at 05:54

## 2021-06-15 RX ADMIN — Medication 975 MILLIGRAM(S): at 03:30

## 2021-06-15 RX ADMIN — Medication 30 MILLIGRAM(S): at 05:53

## 2021-06-15 RX ADMIN — Medication 975 MILLIGRAM(S): at 12:00

## 2021-06-15 RX ADMIN — Medication 975 MILLIGRAM(S): at 16:13

## 2021-06-15 RX ADMIN — Medication 30 MILLIGRAM(S): at 11:05

## 2021-06-15 RX ADMIN — Medication 975 MILLIGRAM(S): at 11:05

## 2021-06-15 RX ADMIN — Medication 30 MILLIGRAM(S): at 06:15

## 2021-06-15 RX ADMIN — Medication 975 MILLIGRAM(S): at 15:34

## 2021-06-15 NOTE — PROGRESS NOTE ADULT - SUBJECTIVE AND OBJECTIVE BOX
RANJEET WOODS is a 62yo now POD#1 s/p TVH-BS and AP repair for prolapse. She was hypotensive coming out of the OR and placed on a phenylephrine drip. She has since been weaned off the drip since 1600 yesterday.     S:    Patient was seen and examined at bedside. Pain is controlled with PRN medication   Tolerating regular diet, denies N/V.   Ambulating without difficulty.   + flatus/-BM/+ voiding    O:   T(C): 36.8 (06-15-21 @ 04:18), Max: 37.2 (06-14-21 @ 20:33)  HR: 61 (06-15-21 @ 04:18) (61 - 78)  BP: 98/61 (06-15-21 @ 04:18) (73/53 - 104/60)  RR: 17 (06-15-21 @ 04:18) (13 - 18)  SpO2: 94% (06-15-21 @ 04:18) (93% - 100%)    Gen: NAD, AOx3  CV: RRR  Pulm: CTAB  Abdomen: soft, nondistended, appropriately tender, + BS   Incision: clean dry and intact  Extrem: no calf tenderness or edema     Labs:       06-14-21 @ 07:01  -  06-15-21 @ 06:37  --------------------------------------------------------  IN: 2266.5 mL / OUT: 3450 mL / NET: -1183.5 mL                RANJEET WOODS is a 62yo now POD#1 s/p TVH-BS and AP repair for prolapse. She was hypotensive coming out of the OR and placed on a phenylephrine drip. She has since been weaned off the drip since 1600 yesterday.     S:    Patient was seen and examined at bedside. Pain is controlled with PRN medication   Tolerating regular diet, denies N/V.   Ambulating without difficulty.  She denies headache, chest pain, shortness of breath, and leg pain.   + flatus/-BM/- void    O:   T(C): 36.8 (06-15-21 @ 04:18), Max: 37.2 (06-14-21 @ 20:33)  HR: 61 (06-15-21 @ 04:18) (61 - 78)  BP: 98/61 (06-15-21 @ 04:18) (73/53 - 104/60)  RR: 17 (06-15-21 @ 04:18) (13 - 18)  SpO2: 94% (06-15-21 @ 04:18) (93% - 100%)    Gen: NAD, AOx3  CV: RRR  Pulm: CTAB  Abdomen: soft, nondistended, appropriately tender, + BS   : + rebolledo with packing in place, removed without difficulty.  Extrem: no calf tenderness or edema     Labs:       06-14-21 @ 07:01  -  06-15-21 @ 06:37  --------------------------------------------------------  IN: 2266.5 mL / OUT: 3450 mL / NET: -1183.5 mL

## 2021-06-15 NOTE — DISCHARGE NOTE PROVIDER - CARE PROVIDER_API CALL
Radha Hopkins)  Obstetrics and Gynecology  [address] [facility], [facility] [address] [place] [address]  Phone: [phone]  Fax: [phone]  Established Patient  Follow Up Time: 2 weeks

## 2021-06-15 NOTE — DISCHARGE NOTE PROVIDER - NSDCCPCAREPLAN_GEN_ALL_CORE_FT
PRINCIPAL DISCHARGE DIAGNOSIS  Diagnosis: Uterine prolapse  Assessment and Plan of Treatment:

## 2021-06-15 NOTE — DISCHARGE NOTE PROVIDER - NSDCCPTREATMENT_GEN_ALL_CORE_FT
PRINCIPAL PROCEDURE  Procedure: Hysterectomy, total, vaginal, with combined anteroposterior colporrhaphy, with cystoscopy  Findings and Treatment:

## 2021-06-15 NOTE — DISCHARGE NOTE PROVIDER - HOSPITAL COURSE
She is a 61 y.o. with a PMH of hyperlipidemia, hypothyroidism, and prediabetes that presented for a scheduled TVH with AP repair. She had an uncomplicated surgery and postoperative course. Upon discharge she was passing gas, tolerating PO, voiding spontaneously, and ambulating.

## 2021-06-15 NOTE — PROGRESS NOTE ADULT - ASSESSMENT
A/P:   60yo now POD#1 s/p TVH-BS and AP repair for prolapse. She was hypotensive coming out of the OR and placed on a phenylephrine drip. She has since been weaned off the drip since 1600 yesterday.    Gen: VSS, patient is hypotensive at baseline  Neuro: Pain well controlled on current regimen  CV: no current issues  Pulm: incentive spirometer use encouraged  GI: Bowel sounds/function normal, tolerating PO diet  : Cabrera and packing removed, awaiting TOV  Heme: AM labs pending  DVT ppx: ambulation encouraged, SCDs when in bed  Dispo: POD#2   A/P:   62yo now POD#1 s/p TVH-BS and AP repair for prolapse. She was hypotensive coming out of the OR and placed on a phenylephrine drip. She has since been weaned off the drip since 1600 yesterday.    Gen: VSS, patient is hypotensive at baseline  Neuro: Pain well controlled on current regimen  CV: no current issues, pt baseline is mildly hypotensive.   Pulm: incentive spirometer use encouraged  GI: Bowel sounds/function normal, tolerating PO diet  : Cabrera and packing removed, awaiting TOV  Heme: AM labs pending  DVT ppx: ambulation encouraged, SCDs when in bed  Dispo: POD#2

## 2021-06-15 NOTE — DISCHARGE NOTE PROVIDER - CARE PROVIDERS DIRECT ADDRESSES
[email or web address]

## 2021-06-15 NOTE — DISCHARGE NOTE PROVIDER - NSDCMRMEDTOKEN_GEN_ALL_CORE_FT
acetaminophen 325 mg oral tablet: 3 tab(s) orally every 6 hours, As Needed -for mild pain MDD:4,000 mg  Crestor 5 mg oral tablet: 1 tab(s) orally once a day  ibuprofen 600 mg oral tablet: 1 tab(s) orally every 6 hours, As Needed -for mild pain   levothyroxine 75 mcg (0.075 mg) oral capsule: 1 cap(s) orally once a day  Probiotic Formula oral capsule: 1 cap(s) orally once a day  Xiidra 5% ophthalmic solution: 1 drop(s) to each affected eye once a day

## 2021-06-15 NOTE — DISCHARGE NOTE NURSING/CASE MANAGEMENT/SOCIAL WORK - PATIENT PORTAL LINK FT
You can access the FollowMyHealth Patient Portal offered by [facility] by registering at the following website: [email or web address]. By joining [employer]’s FollowMyHealth portal, you will also be able to view your health information using other applications (apps) compatible with our system.

## 2021-06-15 NOTE — DISCHARGE NOTE PROVIDER - NSDCFUADDINST_GEN_ALL_CORE_FT
No heavy housework for 6-8 weeks. No lifting greater than 10 lbs for at east 6-8 weeks. No bathtub, bathing or swimming pool. You may drive if not taking narcotics. You may use stairs. Take Colace 100mg 2 times a day or Miralax one capful every night for constipation.

## 2021-06-16 LAB — SURGICAL PATHOLOGY STUDY: SIGNIFICANT CHANGE UP

## 2021-06-21 PROBLEM — R73.03 PREDIABETES: Chronic | Status: ACTIVE | Noted: 2021-06-08

## 2021-06-21 PROBLEM — Z92.29 PERSONAL HISTORY OF OTHER DRUG THERAPY: Chronic | Status: ACTIVE | Noted: 2021-06-08

## 2021-06-24 ENCOUNTER — APPOINTMENT (OUTPATIENT)
Dept: OBGYN | Facility: CLINIC | Age: 61
End: 2021-06-24
Payer: COMMERCIAL

## 2021-06-24 VITALS
DIASTOLIC BLOOD PRESSURE: 73 MMHG | HEIGHT: 63 IN | HEART RATE: 84 BPM | BODY MASS INDEX: 23.39 KG/M2 | SYSTOLIC BLOOD PRESSURE: 129 MMHG | WEIGHT: 132 LBS

## 2021-06-24 DIAGNOSIS — R39.9 UNSPECIFIED SYMPTOMS AND SIGNS INVOLVING THE GENITOURINARY SYSTEM: ICD-10-CM

## 2021-06-24 PROCEDURE — 99024 POSTOP FOLLOW-UP VISIT: CPT

## 2021-06-25 NOTE — HISTORY OF PRESENT ILLNESS
[0/10] : no pain reported [Fever] : no fever [Chills] : no chills [Vomiting] : no vomiting [Nausea] : no nausea [Clean/Dry/Intact] : clean, dry and intact [Erythema] : not erythematous [Normal] : the vagina was normal [Doing Well] : is doing well [Excellent Pain Control] : has excellent pain control [No Sign of Infection] : is showing no signs of infection [None] : None [de-identified] : pod 9 sp tvh ap repair. reports mild rectal pressure, improving daily. slight vb.  [de-identified] : vag incisions helaing well

## 2021-06-30 ENCOUNTER — APPOINTMENT (OUTPATIENT)
Dept: OBGYN | Facility: CLINIC | Age: 61
End: 2021-06-30
Payer: COMMERCIAL

## 2021-06-30 VITALS
WEIGHT: 132 LBS | SYSTOLIC BLOOD PRESSURE: 123 MMHG | HEART RATE: 83 BPM | HEIGHT: 63 IN | DIASTOLIC BLOOD PRESSURE: 80 MMHG | BODY MASS INDEX: 23.39 KG/M2

## 2021-06-30 DIAGNOSIS — N89.8 OTHER SPECIFIED NONINFLAMMATORY DISORDERS OF VAGINA: ICD-10-CM

## 2021-06-30 DIAGNOSIS — T81.40XA INFECTION FOLLOWING A PROCEDURE, UNSPECIFIED, INITIAL ENCOUNTER: ICD-10-CM

## 2021-06-30 LAB — BACTERIA UR CULT: NORMAL

## 2021-06-30 PROCEDURE — 99072 ADDL SUPL MATRL&STAF TM PHE: CPT

## 2021-06-30 PROCEDURE — 99214 OFFICE O/P EST MOD 30 MIN: CPT | Mod: 24

## 2021-06-30 NOTE — PLAN
[FreeTextEntry1] : \par Subjective history and physical examination consistent with an early postoperative infection.  Her anterior repair and vaginal cuff are both intact.  The anterior repair has signs of inflammation and white discharge consistent with an early infection.  Patient will be started on Bactrim twice daily for 10 days time and was given call, follow-up, ER precautions.  She will follow up, postoperatively, as directed.  She is given opportunity ask questions and all questions were addressed.

## 2021-06-30 NOTE — PHYSICAL EXAM
[Absent] : absent [FreeTextEntry4] : Anterior repair intact, with signs of an early infection; inflammation and white discharge.  Vaginal cuff intact both on visual and manual inspection.

## 2021-06-30 NOTE — HISTORY OF PRESENT ILLNESS
[FreeTextEntry1] : 61-year-old female presenting office for vaginal pressure and a white appearing object upon self examination the day prior.  She is status post total vaginal hysterectomy with anterior repair on 6/14/2021.  She has no signs or symptoms of postoperative infection, such as fever or chills.  No reported discharge.

## 2021-07-01 RX ORDER — DOXYCYCLINE 100 MG/1
100 CAPSULE ORAL
Qty: 10 | Refills: 0 | Status: ACTIVE | COMMUNITY
Start: 2021-07-01 | End: 1900-01-01

## 2021-07-01 RX ORDER — SULFAMETHOXAZOLE AND TRIMETHOPRIM 800; 160 MG/1; MG/1
800-160 TABLET ORAL
Qty: 20 | Refills: 0 | Status: COMPLETED | COMMUNITY
Start: 2021-06-30 | End: 2021-07-01

## 2021-07-16 ENCOUNTER — APPOINTMENT (OUTPATIENT)
Dept: OBGYN | Facility: CLINIC | Age: 61
End: 2021-07-16
Payer: COMMERCIAL

## 2021-07-16 VITALS
SYSTOLIC BLOOD PRESSURE: 121 MMHG | DIASTOLIC BLOOD PRESSURE: 76 MMHG | HEIGHT: 63 IN | HEART RATE: 80 BPM | BODY MASS INDEX: 23.39 KG/M2 | WEIGHT: 132 LBS

## 2021-07-16 PROCEDURE — 99024 POSTOP FOLLOW-UP VISIT: CPT

## 2021-07-18 NOTE — HISTORY OF PRESENT ILLNESS
[None] : no vaginal bleeding [Normal] : the vagina was normal [Excellent Pain Control] : has excellent pain control [No Sign of Infection] : is showing no signs of infection [Sutures Removed] : sutures were removed [de-identified] : 3 weeks sp tvh ap repair, co vaginal pressure like a tampon is in.  [de-identified] : incisions intact, nl exam. visible stitches cut.

## 2021-07-20 LAB — BACTERIA GENITAL AEROBE CULT: NORMAL

## 2021-07-21 NOTE — ASU PREOP CHECKLIST - CHLOROHEXIDINE WASH 3
Ongoing for 4 weeks   No relief with OTC medications   Start augmentin   Call if symptoms worsen or fail to improve
Recommend Flonase   Zyrtec, allegra, or claritin prn   Call if symptoms worsen or fail to improve
12-Jun-2021 05:51

## 2021-07-30 ENCOUNTER — APPOINTMENT (OUTPATIENT)
Dept: OBGYN | Facility: CLINIC | Age: 61
End: 2021-07-30
Payer: COMMERCIAL

## 2021-07-30 VITALS
SYSTOLIC BLOOD PRESSURE: 110 MMHG | DIASTOLIC BLOOD PRESSURE: 80 MMHG | HEIGHT: 63 IN | WEIGHT: 132 LBS | BODY MASS INDEX: 23.39 KG/M2

## 2021-07-30 PROCEDURE — 99024 POSTOP FOLLOW-UP VISIT: CPT

## 2021-07-30 NOTE — ASSESSMENT
[Doing Well] : is doing well [Excellent Pain Control] : has excellent pain control [No Sign of Infection] : is showing no signs of infection [de-identified] : vault healing well, nt.

## 2021-08-31 ENCOUNTER — APPOINTMENT (OUTPATIENT)
Dept: OBGYN | Facility: CLINIC | Age: 61
End: 2021-08-31
Payer: COMMERCIAL

## 2021-08-31 VITALS
HEIGHT: 63 IN | SYSTOLIC BLOOD PRESSURE: 148 MMHG | WEIGHT: 132 LBS | DIASTOLIC BLOOD PRESSURE: 85 MMHG | BODY MASS INDEX: 23.39 KG/M2

## 2021-08-31 DIAGNOSIS — Z90.710 ACQUIRED ABSENCE OF BOTH CERVIX AND UTERUS: ICD-10-CM

## 2021-08-31 PROCEDURE — 99024 POSTOP FOLLOW-UP VISIT: CPT

## 2021-09-01 PROBLEM — Z90.710 S/P VAGINAL HYSTERECTOMY: Status: ACTIVE | Noted: 2021-06-30

## 2022-02-14 ENCOUNTER — APPOINTMENT (OUTPATIENT)
Dept: OBGYN | Facility: CLINIC | Age: 62
End: 2022-02-14
Payer: COMMERCIAL

## 2022-02-14 VITALS
SYSTOLIC BLOOD PRESSURE: 120 MMHG | BODY MASS INDEX: 24.45 KG/M2 | WEIGHT: 138 LBS | DIASTOLIC BLOOD PRESSURE: 80 MMHG | HEIGHT: 63 IN

## 2022-02-14 DIAGNOSIS — N95.2 POSTMENOPAUSAL ATROPHIC VAGINITIS: ICD-10-CM

## 2022-02-14 DIAGNOSIS — N95.0 POSTMENOPAUSAL BLEEDING: ICD-10-CM

## 2022-02-14 PROCEDURE — 99213 OFFICE O/P EST LOW 20 MIN: CPT

## 2022-02-14 RX ORDER — ESTRADIOL 0.1 MG/G
0.1 CREAM VAGINAL
Qty: 1 | Refills: 1 | Status: ACTIVE | COMMUNITY
Start: 2022-02-14 | End: 1900-01-01

## 2022-02-16 NOTE — HISTORY OF PRESENT ILLNESS
[FreeTextEntry1] : 62 yo pt here w co recurrent vag bleeding w sexual activity. . pt is status post tvh ap repair.

## 2022-02-16 NOTE — PHYSICAL EXAM
[Labia Majora] : normal [Normal] : normal [Atrophy] : atrophy [Absent] : absent [FreeTextEntry4] : midline 6 oclock small laceration.

## 2022-04-11 NOTE — H&P PST ADULT - ABORTIONS, OB PROFILE
Physical Therapy     Referred by: KILEY Jacobs; Medical Diagnosis (from order):    Diagnosis Information      Diagnosis    719.46 (ICD-9-CM) - M25.562 (ICD-10-CM) - Acute pain of left knee                Daily Treatment Note    Visit:  3     SUBJECTIVE                                                                                                               She think the patch seemed to help. She has been doing her workouts and able to do more. Overall she has less pain and occasional twinges. Standing at work is also better.  Functional Change: See above  Pain / Symptoms:  Pain rating (out of 10): Current: 2     OBJECTIVE                                                                                                                        TREATMENT                                                                                                                  Therapeutic Exercise:  Nustep L5 x 4 min  Calf raises x 10  Standing hip extension, flexion and abduction green theraband 1 x 10 each bilateral   Romberg stance EC x 30 sec   Tandem stance EO x 30 sec bilateral   Alternating marching x 10 bilateral   Step ups 4\" box 1 x 10 bilateral   Mini squats x 10   Tandem walking in // bars    Skilled input: tactile instruction/cues, verbal instruction/cues and posture correction    Writer verbally educated and received verbal consent for hand placement, positioning of patient, and techniques to be performed today from patient for clothing adjustments for techniques, hand placement and palpation for techniques, therapist position for techniques and modality application as described above and how they are pertinent to the patient's plan of care.    Home Exercise Program/Education Materials: Access Code: [email or web address]  URL: [email or web address]  Date: 03/29/2022  Prepared by: Andree James-Ausloos    Exercises  · Clamshell - 1-2 x daily - 7 x weekly - 10 reps - 3 hold  · Seated Hamstring  Stretch - 2 x daily - 7 x weekly - 2 reps - 30 hold  · Supine Bridge - 1-2 x daily - 7 x weekly - 10 reps - 3 hold  · Supine Quadricep Sets - 1-2 x daily - 7 x weekly - 10 reps - 3 hold  · Seated Long Arc Quad with Ankle Weight - 1-2 x daily - 7 x weekly - 10 reps - 3 hold  · Seated Hip Flexion March with Ankle Weights - 2 x daily - 7 x weekly - 10 reps - 3 hold  · Standing Hamstring Curl with Resistance - 2 x daily - 7 x weekly - 10 reps - 3 hold  · Seated Hip Abduction with Resistance - 2 x daily - 7 x weekly - 10 reps - 3 hold  · Sit to Stand - 1 x daily - 7 x weekly - 5-10 reps  · Hip Extension with Resistance Loop - 2 x daily - 3-4 x weekly - 10 reps - 3 hold  · Hip Abduction with Resistance Loop - 2 x daily - 3-4 x weekly - 10 reps - 3 hold  · Standing Hip Flexion with Resistance Loop - 2 x daily - 3-4 x weekly - 10 reps - 3 hold  · Mini Squat with Counter Support - 1 x daily - 3-4 x weekly - 10 reps                        Iontophoresis ([id number])  Number of applications: 2 of up to: 6  Location: left lateral knee  IontoPatch STAT   1.0 mL dexamethasone  Dosage: 80 mA-Minutes  Wear Time: Approximately 4 hours  Instruction: remove after stated wear time; monitor any skin reaction  Results: no change in symptoms immediately following  Reaction: no adverse reaction to treatment      ASSESSMENT                                                                                                             Patient was able to tolerate more standing exercises. She did have some difficulty with hip/thigh dissociation during squats and required more verbal cuing. Educated patient on glute activation for stairs and to incorporate at home. Added to HEP  Pain/symptoms after session (out of 10): 3  Patient Education:   Results of above outlined education: Demonstrates understanding and Verbalizes understanding      PLAN                                                                                                                            Suggestions for next session as indicated: Progress per plan of care, add in step downs         Therapy procedure time and total treatment time can be found documented on the Time Entry flowsheet   0

## 2023-01-31 ENCOUNTER — NON-APPOINTMENT (OUTPATIENT)
Age: 63
End: 2023-01-31

## 2023-01-31 ENCOUNTER — APPOINTMENT (OUTPATIENT)
Dept: OBGYN | Facility: CLINIC | Age: 63
End: 2023-01-31
Payer: COMMERCIAL

## 2023-01-31 VITALS
BODY MASS INDEX: 27.88 KG/M2 | HEIGHT: 60 IN | WEIGHT: 142 LBS | SYSTOLIC BLOOD PRESSURE: 147 MMHG | DIASTOLIC BLOOD PRESSURE: 80 MMHG

## 2023-01-31 PROCEDURE — 99396 PREV VISIT EST AGE 40-64: CPT

## 2023-01-31 NOTE — HISTORY OF PRESENT ILLNESS
[FreeTextEntry1] : 61 yo pt sp tvh last year, here for annual exam\par feeling some pressure  walking\par denies vb, pel pain\par chol elevated, having bad side effects.

## 2023-02-01 LAB — HPV HIGH+LOW RISK DNA PNL CVX: DETECTED

## 2023-02-08 LAB — CYTOLOGY CVX/VAG DOC THIN PREP: ABNORMAL

## 2023-03-20 ENCOUNTER — APPOINTMENT (OUTPATIENT)
Dept: OBGYN | Facility: CLINIC | Age: 63
End: 2023-03-20
Payer: COMMERCIAL

## 2023-03-20 VITALS
WEIGHT: 142 LBS | HEIGHT: 60 IN | SYSTOLIC BLOOD PRESSURE: 152 MMHG | BODY MASS INDEX: 27.88 KG/M2 | DIASTOLIC BLOOD PRESSURE: 80 MMHG

## 2023-03-20 PROCEDURE — 57421 EXAM/BIOPSY OF VAG W/SCOPE: CPT

## 2023-03-20 NOTE — PROCEDURE
[Colposcopy] : Colposcopy  [Time out performed] : Pre-procedure time out performed.  Patient's name, date of birth and procedure confirmed. [Consent Obtained] : Consent obtained [Risks] : risks [Benefits] : benefits [Alternatives] : alternatives [Patient] : patient [Infection] : infection [Bleeding] : bleeding [Allergic Reaction] : allergic reaction [ASCUS] : ASCUS [HPV High Risk] : HPV high risk [Lesion] : lesion seen [Biopsy] : biopsy taken [Hemostasis Obtained] : Hemostasis obtained [Tolerated Well] : the patient tolerated the procedure well [de-identified] : awe [de-identified] : 12 oclock

## 2023-03-20 NOTE — PLAN
[FreeTextEntry1] : ascus hpv+ pap of vag vault. \par colpo done, rto 2 weeks. if dysplastic, consider imiquimod pv.

## 2023-04-03 ENCOUNTER — APPOINTMENT (OUTPATIENT)
Dept: OBGYN | Facility: CLINIC | Age: 63
End: 2023-04-03
Payer: COMMERCIAL

## 2023-04-03 VITALS
HEIGHT: 60 IN | SYSTOLIC BLOOD PRESSURE: 136 MMHG | DIASTOLIC BLOOD PRESSURE: 80 MMHG | WEIGHT: 142 LBS | BODY MASS INDEX: 27.88 KG/M2

## 2023-04-03 DIAGNOSIS — B97.7 PAPILLOMAVIRUS AS THE CAUSE OF DISEASES CLASSIFIED ELSEWHERE: ICD-10-CM

## 2023-04-03 PROCEDURE — 99214 OFFICE O/P EST MOD 30 MIN: CPT

## 2023-04-03 NOTE — PLAN
[FreeTextEntry1] : tca applied to cuff.\par dw pt benign pathology, can fu 1 yr\par area in vagina pt is concerned with seems to be top of posterior repair, denies pain, pt reassured. \par dw pt consideration of vaginal  imiquimod if hpv persists.

## 2023-04-03 NOTE — HISTORY OF PRESENT ILLNESS
[FreeTextEntry1] : 63 yo pt here for fu to colpo. path showed inflammation with focal atypia. \par pt feeling bump in vagina near introitus, had tvh last year.\par

## 2023-04-25 LAB — CORE LAB BIOPSY: NORMAL

## 2023-12-18 NOTE — H&P PST ADULT - LYMPHATIC
posterior cervical L/posterior cervical R/anterior cervical L/anterior cervical R/supraclavicular L/supraclavicular R
normal...

## 2024-05-28 DIAGNOSIS — Z13.820 ENCOUNTER FOR SCREENING FOR OSTEOPOROSIS: ICD-10-CM

## 2024-05-28 DIAGNOSIS — Z12.39 ENCOUNTER FOR OTHER SCREENING FOR MALIGNANT NEOPLASM OF BREAST: ICD-10-CM

## 2024-05-28 DIAGNOSIS — Z01.419 ENCOUNTER FOR GYNECOLOGICAL EXAMINATION (GENERAL) (ROUTINE) W/OUT ABNORMAL FINDINGS: ICD-10-CM

## 2024-05-29 ENCOUNTER — APPOINTMENT (OUTPATIENT)
Dept: OBGYN | Facility: CLINIC | Age: 64
End: 2024-05-29
Payer: COMMERCIAL

## 2024-05-29 VITALS
BODY MASS INDEX: 26.11 KG/M2 | WEIGHT: 133 LBS | SYSTOLIC BLOOD PRESSURE: 126 MMHG | HEIGHT: 60 IN | DIASTOLIC BLOOD PRESSURE: 76 MMHG

## 2024-05-29 DIAGNOSIS — R10.2 PELVIC AND PERINEAL PAIN: ICD-10-CM

## 2024-05-29 DIAGNOSIS — Z00.00 ENCOUNTER FOR GENERAL ADULT MEDICAL EXAMINATION W/OUT ABNORMAL FINDINGS: ICD-10-CM

## 2024-05-29 PROCEDURE — 99396 PREV VISIT EST AGE 40-64: CPT

## 2024-05-29 NOTE — HISTORY OF PRESENT ILLNESS
[FreeTextEntry1] : 63 yo p2  pt  here for annual exam 2 gs-nb and 2, expecting gd any day day. has new docs- dr carmella hamilton hx- colitis, hasnt been active in a long time denies gyn issues surg-  meds- synthroid

## 2024-05-31 LAB — HPV HIGH+LOW RISK DNA PNL CVX: DETECTED

## 2024-06-04 LAB — CYTOLOGY CVX/VAG DOC THIN PREP: ABNORMAL

## 2024-07-29 ENCOUNTER — APPOINTMENT (OUTPATIENT)
Dept: OBGYN | Facility: CLINIC | Age: 64
End: 2024-07-29

## 2024-07-29 VITALS
SYSTOLIC BLOOD PRESSURE: 131 MMHG | BODY MASS INDEX: 25.52 KG/M2 | DIASTOLIC BLOOD PRESSURE: 76 MMHG | WEIGHT: 130 LBS | HEART RATE: 83 BPM | HEIGHT: 60 IN

## 2024-07-29 DIAGNOSIS — B97.7 PAPILLOMAVIRUS AS THE CAUSE OF DISEASES CLASSIFIED ELSEWHERE: ICD-10-CM

## 2024-07-29 PROCEDURE — 57421 EXAM/BIOPSY OF VAG W/SCOPE: CPT

## 2024-07-29 NOTE — PROCEDURE
[Colposcopy] : Colposcopy  [Time out performed] : Pre-procedure time out performed.  Patient's name, date of birth and procedure confirmed. [Consent Obtained] : Consent obtained [Risks] : risks [Benefits] : benefits [Alternatives] : alternatives [Patient] : patient [Infection] : infection [Bleeding] : bleeding [Allergic Reaction] : allergic reaction [LGSIL] : LGSIL [HPV High Risk] : HPV high risk [Biopsy] : biopsy taken [Hemostasis Obtained] : Hemostasis obtained [Tolerated Well] : the patient tolerated the procedure well [de-identified] : 9 oclock awe

## 2024-07-30 ENCOUNTER — APPOINTMENT (OUTPATIENT)
Dept: ANTEPARTUM | Facility: CLINIC | Age: 64
End: 2024-07-30
Payer: COMMERCIAL

## 2024-07-30 ENCOUNTER — APPOINTMENT (OUTPATIENT)
Dept: OBGYN | Facility: CLINIC | Age: 64
End: 2024-07-30
Payer: COMMERCIAL

## 2024-07-30 ENCOUNTER — ASOB RESULT (OUTPATIENT)
Age: 64
End: 2024-07-30

## 2024-07-30 VITALS
WEIGHT: 130 LBS | SYSTOLIC BLOOD PRESSURE: 123 MMHG | BODY MASS INDEX: 25.52 KG/M2 | DIASTOLIC BLOOD PRESSURE: 82 MMHG | HEIGHT: 60 IN | HEART RATE: 106 BPM

## 2024-07-30 DIAGNOSIS — R10.2 PELVIC AND PERINEAL PAIN: ICD-10-CM

## 2024-07-30 PROCEDURE — 76856 US EXAM PELVIC COMPLETE: CPT | Mod: 59

## 2024-07-30 PROCEDURE — 76830 TRANSVAGINAL US NON-OB: CPT

## 2024-07-30 PROCEDURE — 99214 OFFICE O/P EST MOD 30 MIN: CPT

## 2024-07-30 NOTE — DISCUSSION/SUMMARY
[FreeTextEntry1] : Patient's symptoms have improved, her ultrasound is within normal limits.  We discussed maintaining hydration, consideration of using a bulking agent agents if the pressure recurs to reduce inflammation.  We spent 30 minutes in this consultation.

## 2024-07-30 NOTE — HISTORY OF PRESENT ILLNESS
[FreeTextEntry1] : This 64-year-old P2 presents to follow-up after having an ultrasound for intermittent episodes of pelvic pressure.  She said that it has not been as bad lately as it was earlier in the spring.  She said it is unpredictable but lately she has not been having any problems with that.  She does have's report having some sciatica.  She denies any dysuria, discharge, vaginal bleeding.  She had a TVH with anterior posterior repair in 2021.  Her ultrasound shows normal adnexa, no free fluid, and no abnormalities.

## 2024-08-08 LAB — CORE LAB BIOPSY: NORMAL
